# Patient Record
Sex: MALE | Race: WHITE | NOT HISPANIC OR LATINO | Employment: OTHER | URBAN - METROPOLITAN AREA
[De-identification: names, ages, dates, MRNs, and addresses within clinical notes are randomized per-mention and may not be internally consistent; named-entity substitution may affect disease eponyms.]

---

## 2017-02-21 ENCOUNTER — COMMUNICATION - HEALTHEAST (OUTPATIENT)
Dept: FAMILY MEDICINE | Facility: CLINIC | Age: 62
End: 2017-02-21

## 2017-02-21 DIAGNOSIS — F32.9 MAJOR DEPRESSIVE DISORDER, SINGLE EPISODE, UNSPECIFIED: ICD-10-CM

## 2017-02-23 ENCOUNTER — COMMUNICATION - HEALTHEAST (OUTPATIENT)
Dept: FAMILY MEDICINE | Facility: CLINIC | Age: 62
End: 2017-02-23

## 2017-02-23 DIAGNOSIS — I10 ESSENTIAL HYPERTENSION, BENIGN: ICD-10-CM

## 2017-03-06 ENCOUNTER — OFFICE VISIT - HEALTHEAST (OUTPATIENT)
Dept: FAMILY MEDICINE | Facility: CLINIC | Age: 62
End: 2017-03-06

## 2017-03-06 DIAGNOSIS — R10.9 ABDOMINAL PAIN: ICD-10-CM

## 2017-03-06 DIAGNOSIS — D12.6 BENIGN NEOPLASM OF COLON: ICD-10-CM

## 2017-03-06 DIAGNOSIS — H61.23 BILATERAL IMPACTED CERUMEN: ICD-10-CM

## 2017-03-06 DIAGNOSIS — Z00.00 ROUTINE GENERAL MEDICAL EXAMINATION AT A HEALTH CARE FACILITY: ICD-10-CM

## 2017-03-06 DIAGNOSIS — K29.70 GASTRITIS: ICD-10-CM

## 2017-03-06 DIAGNOSIS — H61.20 CERUMEN IMPACTION: ICD-10-CM

## 2017-03-06 DIAGNOSIS — Z72.0 TOBACCO USE: ICD-10-CM

## 2017-03-06 DIAGNOSIS — I25.10 CORONARY ATHEROSCLEROSIS: ICD-10-CM

## 2017-03-06 DIAGNOSIS — E78.5 HYPERLIPIDEMIA: ICD-10-CM

## 2017-03-06 LAB
CHOLEST SERPL-MCNC: 135 MG/DL
FASTING STATUS PATIENT QL REPORTED: YES
HDLC SERPL-MCNC: 50 MG/DL
LDLC SERPL CALC-MCNC: 73 MG/DL
PSA SERPL-MCNC: 1.4 NG/ML (ref 0–4.5)
TRIGL SERPL-MCNC: 61 MG/DL

## 2017-03-06 ASSESSMENT — MIFFLIN-ST. JEOR: SCORE: 1408.61

## 2017-04-26 ENCOUNTER — COMMUNICATION - HEALTHEAST (OUTPATIENT)
Dept: FAMILY MEDICINE | Facility: CLINIC | Age: 62
End: 2017-04-26

## 2017-04-26 DIAGNOSIS — E78.5 HYPERLIPIDEMIA: ICD-10-CM

## 2017-05-23 ENCOUNTER — COMMUNICATION - HEALTHEAST (OUTPATIENT)
Dept: FAMILY MEDICINE | Facility: CLINIC | Age: 62
End: 2017-05-23

## 2017-05-23 DIAGNOSIS — F32.9 MAJOR DEPRESSIVE DISORDER, SINGLE EPISODE, UNSPECIFIED: ICD-10-CM

## 2017-05-24 ENCOUNTER — COMMUNICATION - HEALTHEAST (OUTPATIENT)
Dept: FAMILY MEDICINE | Facility: CLINIC | Age: 62
End: 2017-05-24

## 2017-05-24 DIAGNOSIS — I10 ESSENTIAL HYPERTENSION, BENIGN: ICD-10-CM

## 2017-06-08 ENCOUNTER — RECORDS - HEALTHEAST (OUTPATIENT)
Dept: ADMINISTRATIVE | Facility: OTHER | Age: 62
End: 2017-06-08

## 2017-10-10 ENCOUNTER — COMMUNICATION - HEALTHEAST (OUTPATIENT)
Dept: FAMILY MEDICINE | Facility: CLINIC | Age: 62
End: 2017-10-10

## 2017-11-20 ENCOUNTER — OFFICE VISIT - HEALTHEAST (OUTPATIENT)
Dept: FAMILY MEDICINE | Facility: CLINIC | Age: 62
End: 2017-11-20

## 2017-11-20 DIAGNOSIS — E78.5 HYPERLIPIDEMIA: ICD-10-CM

## 2017-11-20 DIAGNOSIS — I10 ESSENTIAL HYPERTENSION, BENIGN: ICD-10-CM

## 2017-11-20 DIAGNOSIS — F32.9 MAJOR DEPRESSIVE DISORDER, SINGLE EPISODE: ICD-10-CM

## 2017-11-20 DIAGNOSIS — G89.29 CHRONIC SCAPULAR PAIN: ICD-10-CM

## 2017-11-20 DIAGNOSIS — M25.512 SHOULDER PAIN, LEFT: ICD-10-CM

## 2017-11-20 DIAGNOSIS — Z72.0 TOBACCO USE: ICD-10-CM

## 2017-11-20 DIAGNOSIS — M89.8X1 CHRONIC SCAPULAR PAIN: ICD-10-CM

## 2017-11-30 ENCOUNTER — RECORDS - HEALTHEAST (OUTPATIENT)
Dept: ADMINISTRATIVE | Facility: OTHER | Age: 62
End: 2017-11-30

## 2018-01-15 ENCOUNTER — COMMUNICATION - HEALTHEAST (OUTPATIENT)
Dept: FAMILY MEDICINE | Facility: CLINIC | Age: 63
End: 2018-01-15

## 2018-01-15 DIAGNOSIS — E78.5 HYPERLIPIDEMIA: ICD-10-CM

## 2018-02-06 ENCOUNTER — OFFICE VISIT - HEALTHEAST (OUTPATIENT)
Dept: FAMILY MEDICINE | Facility: CLINIC | Age: 63
End: 2018-02-06

## 2018-02-06 DIAGNOSIS — G56.02 CARPAL TUNNEL SYNDROME ON LEFT: ICD-10-CM

## 2018-02-06 DIAGNOSIS — G56.22 ULNAR NERVE COMPRESSION, LEFT: ICD-10-CM

## 2018-02-06 DIAGNOSIS — Z72.0 TOBACCO USE: ICD-10-CM

## 2018-02-06 DIAGNOSIS — I25.10 CORONARY ATHEROSCLEROSIS: ICD-10-CM

## 2018-02-06 DIAGNOSIS — I10 ESSENTIAL HYPERTENSION, BENIGN: ICD-10-CM

## 2018-02-06 DIAGNOSIS — Z01.818 PREOP EXAMINATION: ICD-10-CM

## 2018-02-06 LAB
ANION GAP SERPL CALCULATED.3IONS-SCNC: 9 MMOL/L (ref 5–18)
ATRIAL RATE - MUSE: 84 BPM
BUN SERPL-MCNC: 13 MG/DL (ref 8–22)
CALCIUM SERPL-MCNC: 8.9 MG/DL (ref 8.5–10.5)
CHLORIDE BLD-SCNC: 107 MMOL/L (ref 98–107)
CO2 SERPL-SCNC: 24 MMOL/L (ref 22–31)
CREAT SERPL-MCNC: 0.78 MG/DL (ref 0.7–1.3)
DIASTOLIC BLOOD PRESSURE - MUSE: NORMAL MMHG
ERYTHROCYTE [DISTWIDTH] IN BLOOD BY AUTOMATED COUNT: 10.8 % (ref 11–14.5)
GFR SERPL CREATININE-BSD FRML MDRD: >60 ML/MIN/1.73M2
GLUCOSE BLD-MCNC: 105 MG/DL (ref 70–125)
HCT VFR BLD AUTO: 47 % (ref 40–54)
HGB BLD-MCNC: 15.5 G/DL (ref 14–18)
INTERPRETATION ECG - MUSE: NORMAL
MCH RBC QN AUTO: 31.8 PG (ref 27–34)
MCHC RBC AUTO-ENTMCNC: 33 G/DL (ref 32–36)
MCV RBC AUTO: 97 FL (ref 80–100)
P AXIS - MUSE: 77 DEGREES
PLATELET # BLD AUTO: 162 THOU/UL (ref 140–440)
PMV BLD AUTO: 8.7 FL (ref 7–10)
POTASSIUM BLD-SCNC: 4.1 MMOL/L (ref 3.5–5)
PR INTERVAL - MUSE: 170 MS
QRS DURATION - MUSE: 88 MS
QT - MUSE: 366 MS
QTC - MUSE: 432 MS
R AXIS - MUSE: 79 DEGREES
RBC # BLD AUTO: 4.87 MILL/UL (ref 4.4–6.2)
SODIUM SERPL-SCNC: 140 MMOL/L (ref 136–145)
SYSTOLIC BLOOD PRESSURE - MUSE: NORMAL MMHG
T AXIS - MUSE: 72 DEGREES
VENTRICULAR RATE- MUSE: 84 BPM
WBC: 6.5 THOU/UL (ref 4–11)

## 2018-02-06 ASSESSMENT — MIFFLIN-ST. JEOR: SCORE: 1413.14

## 2018-02-10 ENCOUNTER — COMMUNICATION - HEALTHEAST (OUTPATIENT)
Dept: FAMILY MEDICINE | Facility: CLINIC | Age: 63
End: 2018-02-10

## 2018-02-10 DIAGNOSIS — I10 ESSENTIAL HYPERTENSION, BENIGN: ICD-10-CM

## 2018-04-02 ENCOUNTER — COMMUNICATION - HEALTHEAST (OUTPATIENT)
Dept: FAMILY MEDICINE | Facility: CLINIC | Age: 63
End: 2018-04-02

## 2018-05-01 ENCOUNTER — OFFICE VISIT - HEALTHEAST (OUTPATIENT)
Dept: FAMILY MEDICINE | Facility: CLINIC | Age: 63
End: 2018-05-01

## 2018-05-01 DIAGNOSIS — F32.9 MAJOR DEPRESSIVE DISORDER, SINGLE EPISODE: ICD-10-CM

## 2018-05-01 DIAGNOSIS — I10 ESSENTIAL HYPERTENSION, BENIGN: ICD-10-CM

## 2018-05-01 DIAGNOSIS — E78.5 HYPERLIPIDEMIA: ICD-10-CM

## 2018-05-01 LAB
ALT SERPL W P-5'-P-CCNC: 26 U/L (ref 0–45)
AST SERPL W P-5'-P-CCNC: 22 U/L (ref 0–40)
CHOLEST SERPL-MCNC: 144 MG/DL
FASTING STATUS PATIENT QL REPORTED: YES
HDLC SERPL-MCNC: 44 MG/DL
LDLC SERPL CALC-MCNC: 83 MG/DL
TRIGL SERPL-MCNC: 85 MG/DL

## 2018-05-01 ASSESSMENT — MIFFLIN-ST. JEOR: SCORE: 1464.17

## 2018-05-02 ENCOUNTER — COMMUNICATION - HEALTHEAST (OUTPATIENT)
Dept: FAMILY MEDICINE | Facility: CLINIC | Age: 63
End: 2018-05-02

## 2018-08-06 ENCOUNTER — COMMUNICATION - HEALTHEAST (OUTPATIENT)
Dept: FAMILY MEDICINE | Facility: CLINIC | Age: 63
End: 2018-08-06

## 2018-08-06 DIAGNOSIS — F32.9 MAJOR DEPRESSIVE DISORDER, SINGLE EPISODE: ICD-10-CM

## 2018-10-14 ENCOUNTER — COMMUNICATION - HEALTHEAST (OUTPATIENT)
Dept: FAMILY MEDICINE | Facility: CLINIC | Age: 63
End: 2018-10-14

## 2018-10-14 DIAGNOSIS — E78.5 HYPERLIPIDEMIA: ICD-10-CM

## 2019-01-23 ENCOUNTER — COMMUNICATION - HEALTHEAST (OUTPATIENT)
Dept: FAMILY MEDICINE | Facility: CLINIC | Age: 64
End: 2019-01-23

## 2019-01-23 DIAGNOSIS — I10 ESSENTIAL HYPERTENSION, BENIGN: ICD-10-CM

## 2019-04-11 ENCOUNTER — COMMUNICATION - HEALTHEAST (OUTPATIENT)
Dept: FAMILY MEDICINE | Facility: CLINIC | Age: 64
End: 2019-04-11

## 2019-04-11 DIAGNOSIS — E78.5 HYPERLIPIDEMIA: ICD-10-CM

## 2019-04-24 ENCOUNTER — COMMUNICATION - HEALTHEAST (OUTPATIENT)
Dept: FAMILY MEDICINE | Facility: CLINIC | Age: 64
End: 2019-04-24

## 2019-04-24 DIAGNOSIS — F32.9 MAJOR DEPRESSIVE DISORDER, SINGLE EPISODE: ICD-10-CM

## 2019-04-25 ENCOUNTER — COMMUNICATION - HEALTHEAST (OUTPATIENT)
Dept: FAMILY MEDICINE | Facility: CLINIC | Age: 64
End: 2019-04-25

## 2019-04-25 DIAGNOSIS — I10 ESSENTIAL HYPERTENSION, BENIGN: ICD-10-CM

## 2019-07-14 ENCOUNTER — COMMUNICATION - HEALTHEAST (OUTPATIENT)
Dept: FAMILY MEDICINE | Facility: CLINIC | Age: 64
End: 2019-07-14

## 2019-07-14 DIAGNOSIS — E78.5 HYPERLIPIDEMIA: ICD-10-CM

## 2019-07-26 ENCOUNTER — COMMUNICATION - HEALTHEAST (OUTPATIENT)
Dept: FAMILY MEDICINE | Facility: CLINIC | Age: 64
End: 2019-07-26

## 2019-07-26 DIAGNOSIS — I10 ESSENTIAL HYPERTENSION, BENIGN: ICD-10-CM

## 2019-10-31 ENCOUNTER — COMMUNICATION - HEALTHEAST (OUTPATIENT)
Dept: FAMILY MEDICINE | Facility: CLINIC | Age: 64
End: 2019-10-31

## 2019-10-31 DIAGNOSIS — I10 ESSENTIAL HYPERTENSION, BENIGN: ICD-10-CM

## 2019-12-01 ENCOUNTER — COMMUNICATION - HEALTHEAST (OUTPATIENT)
Dept: FAMILY MEDICINE | Facility: CLINIC | Age: 64
End: 2019-12-01

## 2019-12-01 DIAGNOSIS — I10 ESSENTIAL HYPERTENSION, BENIGN: ICD-10-CM

## 2019-12-18 ENCOUNTER — OFFICE VISIT - HEALTHEAST (OUTPATIENT)
Dept: FAMILY MEDICINE | Facility: CLINIC | Age: 64
End: 2019-12-18

## 2019-12-18 DIAGNOSIS — F32.9 MAJOR DEPRESSIVE DISORDER, SINGLE EPISODE: ICD-10-CM

## 2019-12-18 DIAGNOSIS — I25.10 ATHEROSCLEROSIS OF CORONARY ARTERY, ANGINA PRESENCE UNSPECIFIED, UNSPECIFIED VESSEL OR LESION TYPE, UNSPECIFIED WHETHER NATIVE OR TRANSPLANTED HEART: ICD-10-CM

## 2019-12-18 DIAGNOSIS — I10 ESSENTIAL HYPERTENSION, BENIGN: ICD-10-CM

## 2019-12-18 DIAGNOSIS — K57.30 DIVERTICULOSIS OF LARGE INTESTINE WITHOUT HEMORRHAGE: ICD-10-CM

## 2019-12-18 DIAGNOSIS — Z72.0 TOBACCO USE: ICD-10-CM

## 2019-12-18 DIAGNOSIS — D12.6 BENIGN NEOPLASM OF COLON, UNSPECIFIED PART OF COLON: ICD-10-CM

## 2019-12-18 DIAGNOSIS — R35.1 NOCTURIA: ICD-10-CM

## 2019-12-18 DIAGNOSIS — Z71.85 IMMUNIZATION COUNSELING: ICD-10-CM

## 2019-12-18 DIAGNOSIS — E78.5 HYPERLIPIDEMIA: ICD-10-CM

## 2019-12-18 LAB
ALBUMIN SERPL-MCNC: 4.1 G/DL (ref 3.5–5)
ALP SERPL-CCNC: 45 U/L (ref 45–120)
ALT SERPL W P-5'-P-CCNC: 19 U/L (ref 0–45)
ANION GAP SERPL CALCULATED.3IONS-SCNC: 14 MMOL/L (ref 5–18)
AST SERPL W P-5'-P-CCNC: 18 U/L (ref 0–40)
BILIRUB SERPL-MCNC: 0.3 MG/DL (ref 0–1)
BUN SERPL-MCNC: 7 MG/DL (ref 8–22)
CALCIUM SERPL-MCNC: 9.3 MG/DL (ref 8.5–10.5)
CHLORIDE BLD-SCNC: 106 MMOL/L (ref 98–107)
CHOLEST SERPL-MCNC: 134 MG/DL
CO2 SERPL-SCNC: 21 MMOL/L (ref 22–31)
CREAT SERPL-MCNC: 0.8 MG/DL (ref 0.7–1.3)
ERYTHROCYTE [DISTWIDTH] IN BLOOD BY AUTOMATED COUNT: 10.9 % (ref 11–14.5)
FASTING STATUS PATIENT QL REPORTED: NO
GFR SERPL CREATININE-BSD FRML MDRD: >60 ML/MIN/1.73M2
GLUCOSE BLD-MCNC: 81 MG/DL (ref 70–125)
HCT VFR BLD AUTO: 47.1 % (ref 40–54)
HDLC SERPL-MCNC: 50 MG/DL
HGB BLD-MCNC: 16 G/DL (ref 14–18)
LDLC SERPL CALC-MCNC: 65 MG/DL
MCH RBC QN AUTO: 32.7 PG (ref 27–34)
MCHC RBC AUTO-ENTMCNC: 34 G/DL (ref 32–36)
MCV RBC AUTO: 96 FL (ref 80–100)
PLATELET # BLD AUTO: 175 THOU/UL (ref 140–440)
PMV BLD AUTO: 9 FL (ref 7–10)
POTASSIUM BLD-SCNC: 4 MMOL/L (ref 3.5–5)
PROT SERPL-MCNC: 6.9 G/DL (ref 6–8)
PSA SERPL-MCNC: 1.8 NG/ML (ref 0–4.5)
RBC # BLD AUTO: 4.89 MILL/UL (ref 4.4–6.2)
SODIUM SERPL-SCNC: 141 MMOL/L (ref 136–145)
TRIGL SERPL-MCNC: 94 MG/DL
WBC: 6 THOU/UL (ref 4–11)

## 2019-12-18 ASSESSMENT — PATIENT HEALTH QUESTIONNAIRE - PHQ9: SUM OF ALL RESPONSES TO PHQ QUESTIONS 1-9: 2

## 2019-12-18 ASSESSMENT — MIFFLIN-ST. JEOR: SCORE: 1444.33

## 2020-01-29 ENCOUNTER — OFFICE VISIT - HEALTHEAST (OUTPATIENT)
Dept: FAMILY MEDICINE | Facility: CLINIC | Age: 65
End: 2020-01-29

## 2020-01-29 DIAGNOSIS — R10.32 LLQ ABDOMINAL PAIN: ICD-10-CM

## 2020-01-29 DIAGNOSIS — K57.30 DIVERTICULOSIS OF LARGE INTESTINE WITHOUT HEMORRHAGE: ICD-10-CM

## 2020-12-22 ENCOUNTER — COMMUNICATION - HEALTHEAST (OUTPATIENT)
Dept: FAMILY MEDICINE | Facility: CLINIC | Age: 65
End: 2020-12-22

## 2020-12-22 DIAGNOSIS — F32.9 MAJOR DEPRESSIVE DISORDER, SINGLE EPISODE: ICD-10-CM

## 2020-12-22 DIAGNOSIS — E78.5 HYPERLIPIDEMIA: ICD-10-CM

## 2020-12-22 RX ORDER — ATORVASTATIN CALCIUM 20 MG/1
TABLET, FILM COATED ORAL
Qty: 90 TABLET | Refills: 3 | Status: SHIPPED | OUTPATIENT
Start: 2020-12-22 | End: 2021-12-30

## 2020-12-22 RX ORDER — SERTRALINE HYDROCHLORIDE 100 MG/1
TABLET, FILM COATED ORAL
Qty: 90 TABLET | Refills: 3 | Status: SHIPPED | OUTPATIENT
Start: 2020-12-22 | End: 2021-12-30

## 2020-12-28 ENCOUNTER — COMMUNICATION - HEALTHEAST (OUTPATIENT)
Dept: FAMILY MEDICINE | Facility: CLINIC | Age: 65
End: 2020-12-28

## 2020-12-28 DIAGNOSIS — I10 ESSENTIAL HYPERTENSION, BENIGN: ICD-10-CM

## 2021-01-05 ENCOUNTER — COMMUNICATION - HEALTHEAST (OUTPATIENT)
Dept: FAMILY MEDICINE | Facility: CLINIC | Age: 66
End: 2021-01-05

## 2021-03-26 ENCOUNTER — COMMUNICATION - HEALTHEAST (OUTPATIENT)
Dept: FAMILY MEDICINE | Facility: CLINIC | Age: 66
End: 2021-03-26

## 2021-03-26 DIAGNOSIS — I10 ESSENTIAL HYPERTENSION, BENIGN: ICD-10-CM

## 2021-04-15 ENCOUNTER — COMMUNICATION - HEALTHEAST (OUTPATIENT)
Dept: FAMILY MEDICINE | Facility: CLINIC | Age: 66
End: 2021-04-15

## 2021-04-15 ENCOUNTER — OFFICE VISIT - HEALTHEAST (OUTPATIENT)
Dept: FAMILY MEDICINE | Facility: CLINIC | Age: 66
End: 2021-04-15

## 2021-04-15 DIAGNOSIS — Z00.00 HEALTH CARE MAINTENANCE: ICD-10-CM

## 2021-04-15 DIAGNOSIS — E55.9 VITAMIN D DEFICIENCY: ICD-10-CM

## 2021-04-15 DIAGNOSIS — R35.1 NOCTURIA: ICD-10-CM

## 2021-04-15 DIAGNOSIS — E78.5 HYPERLIPIDEMIA, UNSPECIFIED HYPERLIPIDEMIA TYPE: ICD-10-CM

## 2021-04-15 DIAGNOSIS — F32.5 MAJOR DEPRESSIVE DISORDER WITH SINGLE EPISODE, IN FULL REMISSION (H): ICD-10-CM

## 2021-04-15 DIAGNOSIS — D12.6 BENIGN NEOPLASM OF COLON, UNSPECIFIED PART OF COLON: ICD-10-CM

## 2021-04-15 DIAGNOSIS — I10 ESSENTIAL HYPERTENSION, BENIGN: ICD-10-CM

## 2021-04-15 DIAGNOSIS — I25.10 ATHEROSCLEROSIS OF CORONARY ARTERY, ANGINA PRESENCE UNSPECIFIED, UNSPECIFIED VESSEL OR LESION TYPE, UNSPECIFIED WHETHER NATIVE OR TRANSPLANTED HEART: ICD-10-CM

## 2021-04-15 RX ORDER — AMLODIPINE AND BENAZEPRIL HYDROCHLORIDE 10; 20 MG/1; MG/1
1 CAPSULE ORAL DAILY
Qty: 90 CAPSULE | Refills: 1 | Status: SHIPPED | OUTPATIENT
Start: 2021-04-15 | End: 2021-10-20

## 2021-04-15 ASSESSMENT — PATIENT HEALTH QUESTIONNAIRE - PHQ9: SUM OF ALL RESPONSES TO PHQ QUESTIONS 1-9: 0

## 2021-04-19 ENCOUNTER — AMBULATORY - HEALTHEAST (OUTPATIENT)
Dept: NURSING | Facility: CLINIC | Age: 66
End: 2021-04-19

## 2021-04-19 ENCOUNTER — AMBULATORY - HEALTHEAST (OUTPATIENT)
Dept: LAB | Facility: CLINIC | Age: 66
End: 2021-04-19

## 2021-04-19 DIAGNOSIS — D12.6 BENIGN NEOPLASM OF COLON, UNSPECIFIED PART OF COLON: ICD-10-CM

## 2021-04-19 DIAGNOSIS — Z01.30 BLOOD PRESSURE CHECK: ICD-10-CM

## 2021-04-19 DIAGNOSIS — I10 ESSENTIAL HYPERTENSION, BENIGN: ICD-10-CM

## 2021-04-19 DIAGNOSIS — E55.9 VITAMIN D DEFICIENCY: ICD-10-CM

## 2021-04-19 DIAGNOSIS — R35.1 NOCTURIA: ICD-10-CM

## 2021-04-19 DIAGNOSIS — E78.5 HYPERLIPIDEMIA, UNSPECIFIED HYPERLIPIDEMIA TYPE: ICD-10-CM

## 2021-04-19 LAB
ALBUMIN SERPL-MCNC: 4.1 G/DL (ref 3.5–5)
ALP SERPL-CCNC: 44 U/L (ref 45–120)
ALT SERPL W P-5'-P-CCNC: 23 U/L (ref 0–45)
ANION GAP SERPL CALCULATED.3IONS-SCNC: 14 MMOL/L (ref 5–18)
AST SERPL W P-5'-P-CCNC: 25 U/L (ref 0–40)
BILIRUB SERPL-MCNC: 0.5 MG/DL (ref 0–1)
BUN SERPL-MCNC: 11 MG/DL (ref 8–22)
CALCIUM SERPL-MCNC: 9.2 MG/DL (ref 8.5–10.5)
CHLORIDE BLD-SCNC: 105 MMOL/L (ref 98–107)
CHOLEST SERPL-MCNC: 157 MG/DL
CO2 SERPL-SCNC: 19 MMOL/L (ref 22–31)
CREAT SERPL-MCNC: 0.93 MG/DL (ref 0.7–1.3)
ERYTHROCYTE [DISTWIDTH] IN BLOOD BY AUTOMATED COUNT: 12.7 % (ref 11–14.5)
FASTING STATUS PATIENT QL REPORTED: NO
GFR SERPL CREATININE-BSD FRML MDRD: >60 ML/MIN/1.73M2
GLUCOSE BLD-MCNC: 108 MG/DL (ref 70–125)
HCT VFR BLD AUTO: 49.8 % (ref 40–54)
HDLC SERPL-MCNC: 51 MG/DL
HGB BLD-MCNC: 16.8 G/DL (ref 14–18)
LDLC SERPL CALC-MCNC: 74 MG/DL
MCH RBC QN AUTO: 32.2 PG (ref 27–34)
MCHC RBC AUTO-ENTMCNC: 33.7 G/DL (ref 32–36)
MCV RBC AUTO: 96 FL (ref 80–100)
PLATELET # BLD AUTO: 148 THOU/UL (ref 140–440)
PMV BLD AUTO: 11.6 FL (ref 7–10)
POTASSIUM BLD-SCNC: 3.9 MMOL/L (ref 3.5–5)
PROT SERPL-MCNC: 7.1 G/DL (ref 6–8)
PSA SERPL-MCNC: 3.3 NG/ML (ref 0–4.5)
RBC # BLD AUTO: 5.21 MILL/UL (ref 4.4–6.2)
SODIUM SERPL-SCNC: 138 MMOL/L (ref 136–145)
TRIGL SERPL-MCNC: 162 MG/DL
WBC: 5.5 THOU/UL (ref 4–11)

## 2021-04-20 ENCOUNTER — AMBULATORY - HEALTHEAST (OUTPATIENT)
Dept: FAMILY MEDICINE | Facility: CLINIC | Age: 66
End: 2021-04-20

## 2021-04-20 DIAGNOSIS — E55.9 VITAMIN D DEFICIENCY: ICD-10-CM

## 2021-04-20 LAB — 25(OH)D3 SERPL-MCNC: 19.8 NG/ML (ref 30–80)

## 2021-04-21 LAB — HCV AB SERPL QL IA: NEGATIVE

## 2021-04-22 ENCOUNTER — COMMUNICATION - HEALTHEAST (OUTPATIENT)
Dept: FAMILY MEDICINE | Facility: CLINIC | Age: 66
End: 2021-04-22

## 2021-04-23 ENCOUNTER — COMMUNICATION - HEALTHEAST (OUTPATIENT)
Dept: FAMILY MEDICINE | Facility: CLINIC | Age: 66
End: 2021-04-23

## 2021-04-24 ENCOUNTER — AMBULATORY - HEALTHEAST (OUTPATIENT)
Dept: NURSING | Facility: CLINIC | Age: 66
End: 2021-04-24

## 2021-05-15 ENCOUNTER — AMBULATORY - HEALTHEAST (OUTPATIENT)
Dept: NURSING | Facility: CLINIC | Age: 66
End: 2021-05-15

## 2021-05-26 ASSESSMENT — PATIENT HEALTH QUESTIONNAIRE - PHQ9: SUM OF ALL RESPONSES TO PHQ QUESTIONS 1-9: 2

## 2021-05-27 VITALS — DIASTOLIC BLOOD PRESSURE: 84 MMHG | HEART RATE: 84 BPM | SYSTOLIC BLOOD PRESSURE: 130 MMHG

## 2021-05-27 ASSESSMENT — PATIENT HEALTH QUESTIONNAIRE - PHQ9: SUM OF ALL RESPONSES TO PHQ QUESTIONS 1-9: 0

## 2021-05-27 NOTE — TELEPHONE ENCOUNTER
Due to be seen    Rx renewed per Medication Renewal Policy. Medication was last renewed on 10/15/18.    Mamta Lai, [facility] Triage/Med Refill 4/12/2019     Requested Prescriptions   Pending Prescriptions Disp Refills     atorvastatin (LIPITOR) 20 MG tablet [Pharmacy Med Name: ATORVASTATIN 20MG TABLETS] 90 tablet 0     Sig: TAKE 1 TABLET BY MOUTH EVERY DAY       Statins Refill Protocol (Hmg CoA Reductase Inhibitors) Passed - 4/11/2019  1:06 PM        Passed - PCP or prescribing provider visit in past 12 months      Last office visit with prescriber/PCP: 5/1/2018 Almas Mathews MD OR same dept: 5/1/2018 Almas Mathews MD OR same specialty: 5/1/2018 Almas Mathews MD  Last physical: 2/6/2018 Last MTM visit: Visit date not found   Next visit within 3 mo: Visit date not found  Next physical within 3 mo: Visit date not found  Prescriber OR PCP: Almas Mathews MD  Last diagnosis associated with med order: 1. Hyperlipidemia  - atorvastatin (LIPITOR) 20 MG tablet [Pharmacy Med Name: ATORVASTATIN 20MG TABLETS]; TAKE 1 TABLET BY MOUTH EVERY DAY  Dispense: 90 tablet; Refill: 0    If protocol passes may refill for 12 months if within 3 months of last provider visit (or a total of 15 months).

## 2021-05-28 NOTE — TELEPHONE ENCOUNTER
RN cannot approve Refill Request    RN can NOT refill this medication PCP messaged that patient is overdue for Labs. Last office visit: 5/1/2018 Almas Mathews MD Last Physical: 2/6/2018 Last MTM visit: Visit date not found Last visit same specialty: 5/1/2018 Almas Mathews MD.  Next visit within 3 mo: Visit date not found  Next physical within 3 mo: Visit date not found      Corin Ribera, [facility] Triage/Med Refill 4/27/2019    Requested Prescriptions   Pending Prescriptions Disp Refills     amLODIPine-benazepril (LOTREL) 10-20 mg per capsule [Pharmacy Med Name: AMLODIPINE-BENAZ 10/20MG CAPSULES] 90 capsule 0     Sig: TAKE 1 CAPSULE BY MOUTH DAILY       Ace Inhibitors Refill Protocol Failed - 4/25/2019  8:36 AM        Failed - Serum Potassium in past 12 months     No results found for: LN-POTASSIUM          Failed - Serum Creatinine in past 12 months     Creatinine   Date Value Ref Range Status   02/06/2018 0.78 0.70 - 1.30 mg/dL Final             Passed - PCP or prescribing provider visit in past 12 months       Last office visit with prescriber/PCP: 5/1/2018 Almas Mathews MD OR same dept: 5/1/2018 Almas Mathews MD OR same specialty: 5/1/2018 Almas Mathews MD  Last physical: 2/6/2018 Last MTM visit: Visit date not found   Next visit within 3 mo: Visit date not found  Next physical within 3 mo: Visit date not found  Prescriber OR PCP: Almas Mathews MD  Last diagnosis associated with med order: 1. Benign Essential Hypertension  - amLODIPine-benazepril (LOTREL) 10-20 mg per capsule [Pharmacy Med Name: AMLODIPINE-BENAZ 10/20MG CAPSULES]; TAKE 1 CAPSULE BY MOUTH DAILY  Dispense: 90 capsule; Refill: 0    If protocol passes may refill for 12 months if within 3 months of last provider visit (or a total of 15 months).             Passed - Blood pressure filed in past 12 months     BP Readings from Last 1 Encounters:   05/01/18 118/70

## 2021-05-28 NOTE — TELEPHONE ENCOUNTER
Due to be seen    Rx renewed per Medication Renewal Policy. Medication was last renewed on 8/6/18.    Mamta Lai, [facility] Triage/Med Refill 4/26/2019     Requested Prescriptions   Pending Prescriptions Disp Refills     sertraline (ZOLOFT) 100 MG tablet [Pharmacy Med Name: SERTRALINE 100MG TABLETS] 30 tablet 0     Sig: TAKE 1 TABLET(100 MG) BY MOUTH DAILY       [facility] Refill Protocol  Passed - 4/24/2019 10:19 PM        Passed - PCP or prescribing provider visit in last year     Last office visit with prescriber/PCP: 5/1/2018 Almas Mathews MD OR same dept: 5/1/2018 Almas Mathews MD OR same specialty: 5/1/2018 Almas Mathews MD  Last physical: 2/6/2018 Last MTM visit: Visit date not found   Next visit within 3 mo: Visit date not found  Next physical within 3 mo: Visit date not found  Prescriber OR PCP: Almas Mathews MD  Last diagnosis associated with med order: 1. Major depressive disorder, single episode  - sertraline (ZOLOFT) 100 MG tablet [Pharmacy Med Name: SERTRALINE 100MG TABLETS]; TAKE 1 TABLET(100 MG) BY MOUTH DAILY  Dispense: 30 tablet; Refill: 0    If protocol passes may refill for 12 months if within 3 months of last provider visit (or a total of 15 months).

## 2021-05-30 VITALS — WEIGHT: 142 LBS | HEIGHT: 68 IN | BODY MASS INDEX: 21.52 KG/M2

## 2021-05-30 NOTE — TELEPHONE ENCOUNTER
RN cannot approve Refill Request    RN can NOT refill this medication PCP messaged that patient is overdue for Office Visit and Protocol failed and NO refill given.   Nery Gonzalez, [facility] Triage/Med Refill 7/26/2019    Requested Prescriptions   Pending Prescriptions Disp Refills     amLODIPine-benazepril (LOTREL) 10-20 mg per capsule [Pharmacy Med Name: AMLODIPINE-BENAZ 10/20MG CAPSULES] 90 capsule 0     Sig: TAKE 1 CAPSULE BY MOUTH DAILY       Ace Inhibitors Refill Protocol Failed - 7/26/2019  1:33 PM        Failed - PCP or prescribing provider visit in past 12 months       Last office visit with prescriber/PCP: 5/1/2018 Almas Mathews MD OR same dept: Visit date not found OR same specialty: 5/1/2018 Almas Mathews MD  Last physical: 2/6/2018 Last MTM visit: Visit date not found   Next visit within 3 mo: Visit date not found  Next physical within 3 mo: Visit date not found  Prescriber OR PCP: Almas Mathews MD  Last diagnosis associated with med order: 1. Benign Essential Hypertension  - amLODIPine-benazepril (LOTREL) 10-20 mg per capsule [Pharmacy Med Name: AMLODIPINE-BENAZ 10/20MG CAPSULES]; TAKE 1 CAPSULE BY MOUTH DAILY  Dispense: 90 capsule; Refill: 0    If protocol passes may refill for 12 months if within 3 months of last provider visit (or a total of 15 months).             Failed - Serum Potassium in past 12 months     No results found for: LN-POTASSIUM          Failed - Blood pressure filed in past 12 months     BP Readings from Last 1 Encounters:   05/01/18 118/70             Failed - Serum Creatinine in past 12 months     Creatinine   Date Value Ref Range Status   02/06/2018 0.78 0.70 - 1.30 mg/dL Final

## 2021-05-30 NOTE — TELEPHONE ENCOUNTER
Refill Given    Refill given per Policy, patient informed they are overdue for Office Visit   OV 5/1/18    Val Anderson, [facility] Triage/Med Refill 7/14/2019    Requested Prescriptions   Pending Prescriptions Disp Refills     atorvastatin (LIPITOR) 20 MG tablet [Pharmacy Med Name: ATORVASTATIN 20MG TABLETS] 90 tablet 0     Sig: TAKE 1 TABLET BY MOUTH EVERY DAY       Statins Refill Protocol (Hmg CoA Reductase Inhibitors) Failed - 7/14/2019  2:18 PM        Failed - PCP or prescribing provider visit in past 12 months      Last office visit with prescriber/PCP: 5/1/2018 Almas Mathews MD OR same dept: Visit date not found OR same specialty: 5/1/2018 Almas Mathews MD  Last physical: 2/6/2018 Last MTM visit: Visit date not found   Next visit within 3 mo: Visit date not found  Next physical within 3 mo: Visit date not found  Prescriber OR PCP: Almas Mathews MD  Last diagnosis associated with med order: 1. Hyperlipidemia  - atorvastatin (LIPITOR) 20 MG tablet [Pharmacy Med Name: ATORVASTATIN 20MG TABLETS]; TAKE 1 TABLET BY MOUTH EVERY DAY  Dispense: 90 tablet; Refill: 0    If protocol passes may refill for 12 months if within 3 months of last provider visit (or a total of 15 months).

## 2021-05-30 NOTE — TELEPHONE ENCOUNTER
Left message #1 at [phone] to call clinic to schedule appt with PCP for lab follow up. Postponing task out to a week and will try again.

## 2021-05-31 ENCOUNTER — RECORDS - HEALTHEAST (OUTPATIENT)
Dept: ADMINISTRATIVE | Facility: CLINIC | Age: 66
End: 2021-05-31

## 2021-05-31 VITALS — WEIGHT: 143 LBS | BODY MASS INDEX: 21.67 KG/M2 | HEIGHT: 68 IN

## 2021-05-31 VITALS — WEIGHT: 145 LBS | BODY MASS INDEX: 22.05 KG/M2

## 2021-06-01 VITALS — WEIGHT: 149 LBS | HEIGHT: 70 IN | BODY MASS INDEX: 21.33 KG/M2

## 2021-06-02 NOTE — TELEPHONE ENCOUNTER
RN cannot approve Refill Request    RN can NOT refill this medication Protocol failed and NO refill given.        Mamta Lai, [facility] Triage/Med Refill 10/31/2019    Requested Prescriptions   Pending Prescriptions Disp Refills     amLODIPine-benazepril (LOTREL) 10-20 mg per capsule [Pharmacy Med Name: AMLODIPINE-BENAZ 10/20MG CAPSULES] 90 capsule 3     Sig: TAKE 1 CAPSULE BY MOUTH EVERY DAY       Ace Inhibitors Refill Protocol Failed - 10/31/2019  3:53 AM        Failed - PCP or prescribing provider visit in past 12 months       Last office visit with prescriber/PCP: 5/1/2018 Almas Mathews MD OR same dept: Visit date not found OR same specialty: 5/1/2018 Almas Mathews MD  Last physical: 2/6/2018 Last MTM visit: Visit date not found   Next visit within 3 mo: Visit date not found  Next physical within 3 mo: Visit date not found  Prescriber OR PCP: Almas Mathews MD  Last diagnosis associated with med order: 1. Benign Essential Hypertension  - amLODIPine-benazepril (LOTREL) 10-20 mg per capsule [Pharmacy Med Name: AMLODIPINE-BENAZ 10/20MG CAPSULES]; TAKE 1 CAPSULE BY MOUTH EVERY DAY  Dispense: 90 capsule; Refill: 0    If protocol passes may refill for 12 months if within 3 months of last provider visit (or a total of 15 months).             Failed - Serum Potassium in past 12 months     No results found for: LN-POTASSIUM          Failed - Blood pressure filed in past 12 months     BP Readings from Last 1 Encounters:   05/01/18 118/70             Failed - Serum Creatinine in past 12 months     Creatinine   Date Value Ref Range Status   02/06/2018 0.78 0.70 - 1.30 mg/dL Final

## 2021-06-03 VITALS
DIASTOLIC BLOOD PRESSURE: 70 MMHG | HEART RATE: 101 BPM | BODY MASS INDEX: 22.58 KG/M2 | OXYGEN SATURATION: 97 % | HEIGHT: 68 IN | WEIGHT: 149 LBS | SYSTOLIC BLOOD PRESSURE: 124 MMHG

## 2021-06-03 NOTE — TELEPHONE ENCOUNTER
RN cannot approve Refill Request    RN can NOT refill this medication Protocol failed and NO refill given.      Mamta Lai, [facility] Triage/Med Refill 12/1/2019    Requested Prescriptions   Pending Prescriptions Disp Refills     amLODIPine-benazepril (LOTREL) 10-20 mg per capsule [Pharmacy Med Name: AMLODIPINE-BENAZ 10/20MG CAPSULES] 90 capsule 3     Sig: TAKE 1 CAPSULE BY MOUTH EVERY DAY       Ace Inhibitors Refill Protocol Failed - 12/1/2019  3:54 AM        Failed - PCP or prescribing provider visit in past 12 months       Last office visit with prescriber/PCP: 5/1/2018 Almas Mathews MD OR same dept: Visit date not found OR same specialty: 5/1/2018 Almas Mathews MD  Last physical: 2/6/2018 Last MTM visit: Visit date not found   Next visit within 3 mo: Visit date not found  Next physical within 3 mo: Visit date not found  Prescriber OR PCP: Almas Mathews MD  Last diagnosis associated with med order: 1. Benign Essential Hypertension  - amLODIPine-benazepril (LOTREL) 10-20 mg per capsule [Pharmacy Med Name: AMLODIPINE-BENAZ 10/20MG CAPSULES]; TAKE 1 CAPSULE BY MOUTH EVERY DAY  Dispense: 30 capsule; Refill: 0    If protocol passes may refill for 12 months if within 3 months of last provider visit (or a total of 15 months).             Failed - Serum Potassium in past 12 months     No results found for: LN-POTASSIUM          Failed - Blood pressure filed in past 12 months     BP Readings from Last 1 Encounters:   05/01/18 118/70             Failed - Serum Creatinine in past 12 months     Creatinine   Date Value Ref Range Status   02/06/2018 0.78 0.70 - 1.30 mg/dL Final

## 2021-06-04 VITALS
HEART RATE: 78 BPM | RESPIRATION RATE: 16 BRPM | BODY MASS INDEX: 22.34 KG/M2 | SYSTOLIC BLOOD PRESSURE: 126 MMHG | WEIGHT: 148 LBS | DIASTOLIC BLOOD PRESSURE: 74 MMHG

## 2021-06-04 NOTE — PROGRESS NOTES
Subjective: This patient's not been in since May 2018 he has hypertension hyperlipidemia he smokes he has a history of major depression.  He continues on sertraline PHQ 9 was checked today came back at 2.    He had a colonoscopy 2017 due again 2022 he has a history of adenomatous colon polyps.    4 of his siblings  in the last 2-1/2 years he has 16 siblings he is #10.  Cause of death was COPD and MI's.    He retired in the last year as well.    Patient's had some intermittent pain in through the left lower quadrant on and off.  Does improve with bowel movement.    Exam today was unremarkable he will monitor that consider CT scan if not improved or recurs.    Patient had some calcification of his coronary arteries in the past continues on Lipitor 20 mg a day takes an aspirin takes amlodipine for blood pressure.    Denies any shortness of breath or chest pain.    Talked to him again regarding some low-dose CT scans of the lungs to monitor his smoking use, lung cancer risk.  He did not want to do that but did agree to a chest x-ray.  He continues to smoke    Tobacco status: He  reports that he has been smoking cigarettes. He has been smoking about 1.50 packs per day. He has never used smokeless tobacco.    Patient Active Problem List    Diagnosis Date Noted     Benign Adenomatous Polyp Of The Large Intestine      Coronary Arteriosclerosis      Major depressive disorder, single episode      Abdominal Pain      Hyperlipidemia      Benign Essential Hypertension      Hemorrhoids      Diverticulosis        Current Outpatient Medications   Medication Sig Dispense Refill     amLODIPine-benazepril (LOTREL) 10-20 mg per capsule Take 1 capsule by mouth daily. 90 capsule 3     aspirin 81 mg chewable tablet Chew 81 mg daily.       atorvastatin (LIPITOR) 20 MG tablet Take 1 tablet (20 mg total) by mouth daily. 90 tablet 3     sertraline (ZOLOFT) 100 MG tablet Take 1 tablet (100 mg total) by mouth daily. 90 tablet 3  "    No current facility-administered medications for this visit.        ROS:   10 point review of systems positive as outlined above otherwise negative.  Patient does smoke not interested in quitting.  Patient does have some nocturia x1 they did check PSA and did a rectal exam which was normal.        Objective:    /70 (Patient Site: Right Arm, Patient Position: Sitting, Cuff Size: Adult Regular)   Pulse (!) 101   Ht 5' 8.25\" (1.734 m)   Wt 149 lb (67.6 kg)   SpO2 97%   BMI 22.49 kg/m    Body mass index is 22.49 kg/m .      General appearance no acute distress    HEENT neck supple oropharynx clear, canals and TMs normal pupils react normally    Neck without adenopathy    Lungs are clear slightly diminished O2 sat 97%    Heart was regular S1-S2 rate at .    Abdomen soft bowel sounds normal no guarding or rebound    Lower extremities without edema skin was normal.    Rectal exam was normal no masses prostate without significant enlargement.    Testes descended normally no evidence of hernia    Labs PSA normal CBC normal    Lipids look good with LDL at 65.    CMP was also normal    Results for orders placed or performed in visit on 12/18/19   PSA, Annual Screen (Prostatic-Specific Antigen)   Result Value Ref Range    PSA 1.8 0.0 - 4.5 ng/mL   HM2(CBC w/o Differential)   Result Value Ref Range    WBC 6.0 4.0 - 11.0 thou/uL    RBC 4.89 4.40 - 6.20 mill/uL    Hemoglobin 16.0 14.0 - 18.0 g/dL    Hematocrit 47.1 40.0 - 54.0 %    MCV 96 80 - 100 fL    MCH 32.7 27.0 - 34.0 pg    MCHC 34.0 32.0 - 36.0 g/dL    RDW 10.9 (L) 11.0 - 14.5 %    Platelets 175 140 - 440 thou/uL    MPV 9.0 7.0 - 10.0 fL   Lipid Cascade RANDOM   Result Value Ref Range    Cholesterol 134 <=199 mg/dL    Triglycerides 94 <=149 mg/dL    HDL Cholesterol 50 >=40 mg/dL    LDL Calculated 65 <=129 mg/dL    Patient Fasting > 8hrs? No    Comprehensive Metabolic Panel   Result Value Ref Range    Sodium 141 136 - 145 mmol/L    Potassium 4.0 3.5 - " 5.0 mmol/L    Chloride 106 98 - 107 mmol/L    CO2 21 (L) 22 - 31 mmol/L    Anion Gap, Calculation 14 5 - 18 mmol/L    Glucose 81 70 - 125 mg/dL    BUN 7 (L) 8 - 22 mg/dL    Creatinine 0.80 0.70 - 1.30 mg/dL    GFR MDRD Af Amer >60 >60 mL/min/1.73m2    GFR MDRD Non Af Amer >60 >60 mL/min/1.73m2    Bilirubin, Total 0.3 0.0 - 1.0 mg/dL    Calcium 9.3 8.5 - 10.5 mg/dL    Protein, Total 6.9 6.0 - 8.0 g/dL    Albumin 4.1 3.5 - 5.0 g/dL    Alkaline Phosphatase 45 45 - 120 U/L    AST 18 0 - 40 U/L    ALT 19 0 - 45 U/L       Assessment:  1. Benign Essential Hypertension  amLODIPine-benazepril (LOTREL) 10-20 mg per capsule    Comprehensive Metabolic Panel   2. Major depressive disorder, single episode  sertraline (ZOLOFT) 100 MG tablet   3. Hyperlipidemia  atorvastatin (LIPITOR) 20 MG tablet    Lipid Murray RANDOM    Comprehensive Metabolic Panel   4. Atherosclerosis of coronary artery, angina presence unspecified, unspecified vessel or lesion type, unspecified whether native or transplanted heart     5. Benign neoplasm of colon, unspecified part of colon  HM2(CBC w/o Differential)   6. Tobacco use  XR Chest 2 Views   7. Nocturia  PSA, Annual Screen (Prostatic-Specific Antigen)   8. Immunization counseling  Pneumococcal polysaccharide vaccine 23-valent 1 yo or older, subq/IM   9. Diverticulosis of large intestine without hemorrhage       Hypertension controlled continue amlodipine benazepril.    Major depression stable PHQ 9 was 2 continue sertraline    Hyperlipidemia controlled on atorvastatin    History of coronary artery disease please see above no active symptoms    History of adenomatous colon polyp recheck in June 2022 with next colonoscopy    Encouraged to quit smoking.  Chest x-ray shows some hyperinflation of the lungs but no new infiltrate or effusion.    Immunization update with PPV 23    Plan: As outlined above, history of some left lower quadrant pain exam was normal now monitor symptoms consider CT scan he will  let me know, discussed possibly some diverticulitis that might flareup..  I did review his colonoscopy and does have significant diverticulosis noted on sigmoid colon.    Discussed appropriate diet with him regarding this as well    This transcription uses voice recognition software, which may contain typographical errors.

## 2021-06-05 NOTE — PROGRESS NOTES
Subjective:    Alonzo Renteria is a 64 y.o. male who presents for evaluation of possible diverticulitis.  He has past history of diverticulitis.  He thinks his last areas flareup, where he had to take antibiotics, was about 6 years ago.  He feels like he has had off-and-on flareups over the past several months, but they have been manageable.  Now, for the past week he has been having significant left lower quadrant pain.  Pain can radiate to his back or his groin and feel achy.  Seems to be worst first thing in the morning.  He has been using ibuprofen and that helps somewhat.  No fevers.  No vomiting.  Occasional nausea.  Normal appetite.  No constipation.  Symptoms are not improving.  He says the symptoms are consistent with when he has had more significant diverticulitis attacks in the past.    Patient Active Problem List   Diagnosis     Benign Adenomatous Polyp Of The Large Intestine     Coronary Arteriosclerosis     Major depressive disorder, single episode     Abdominal Pain     Hyperlipidemia     Benign Essential Hypertension     Hemorrhoids     Diverticulosis       Current Outpatient Medications:      amLODIPine-benazepril (LOTREL) 10-20 mg per capsule, Take 1 capsule by mouth daily., Disp: 90 capsule, Rfl: 3     aspirin 81 mg chewable tablet, Chew 81 mg daily., Disp: , Rfl:      atorvastatin (LIPITOR) 20 MG tablet, Take 1 tablet (20 mg total) by mouth daily., Disp: 90 tablet, Rfl: 3     sertraline (ZOLOFT) 100 MG tablet, Take 1 tablet (100 mg total) by mouth daily., Disp: 90 tablet, Rfl: 3     ciprofloxacin HCl (CIPRO) 500 MG tablet, Take 1 tablet (500 mg total) by mouth 2 (two) times a day for 10 days., Disp: 20 tablet, Rfl: 0     metroNIDAZOLE (FLAGYL) 500 MG tablet, Take 1 tablet (500 mg total) by mouth 3 (three) times a day for 10 days., Disp: 30 tablet, Rfl: 0     Objective:   Allergies:  Clindamycin    Vitals:  Vitals:    01/29/20 1405   BP: 126/74   Patient Site: Left Arm   Patient Position: Sitting    Cuff Size: Adult Regular   Pulse: 78   Resp: 16   Weight: 148 lb (67.1 kg)     Body mass index is 22.34 kg/m .    Vital signs reviewed.  General: Patient is alert and oriented x 3, in no apparent distress  Cardiac: regular rate and rhythm, no murmurs  Pulmonary: lungs clear to auscultation bilaterally, no crackles, rales, rhonchi, or wheezing noted  Abdomen: Mild pain with palpation of the left lower quadrant, no hepatosplenomegaly, negative Nagel's sign, no pain over McBurney's point, positive bowel sounds, no masses palpable      Assessment and Plan:   1.  Left lower quadrant pain with history of diverticulitis.  Probable diverticulitis episode.  Given his symptoms, history, and exam findings, I think treating him for diverticulitis is reasonable.  Appendicitis unlikely given presentation and exam findings.  Prescriptions sent today for Cipro and metronidazole for 10 days.  He has taken these before without problem.  He will continue to monitor his symptoms.  We reviewed specific reasons for him to notify us, or go in to the ER, such as fever, nausea and vomiting, or significantly worsening left lower quadrant pain.    This dictation uses voice recognition software, which may contain typographical errors.

## 2021-06-09 NOTE — PROGRESS NOTES
Subjective: This patient comes in for physical.  He was seen last year 2/23/16 please see that discussion.  He was having some abdominal symptoms at that time and had a mildly prominent pancreatic duct and a CT scan of the abdomen there was no mass no pancreatic mass    He went on for EGD as well as endoscopic ultrasound which was unremarkable.  There was some chronic reactive gastropathy changes.    He does smoke we discussed that that could be aggravating the stomach.  Discussed using omeprazole he didn't feel he had any symptoms so didn't want to do that.    He does have coronary artery calcifications he continues on Lipitor 20 mg a day also amlodipine for blood pressure he takes an aspirin a day.    Regarding his depression he stable on sertraline 100 mg 1 a day.    Patient has a history of colon polyps it's been 5 years he needs to get a colonoscopy he said he would schedule that I did put in a referral.    He continues to smoke he's had 40 pack years and does want to take Chantix I discussed pros cons side effects and elected to go ahead with that.  He'll contact us if he has any side effects at all, discussed increased risks for depression suicide and vivid dreams.    Also discussed getting a low dose screening CT scan for screening for lung cancer with the smoking he said he wanted to do that as well.    Tobacco status: He  reports that he has been smoking Cigarettes.  He has been smoking about 1.50 packs per day. He has never used smokeless tobacco.    Patient Active Problem List    Diagnosis Date Noted     Benign Adenomatous Polyp Of The Large Intestine      Coronary Arteriosclerosis      Major Depression, Single Episode      Abdominal Pain      Hyperlipidemia      Benign Essential Hypertension      Hemorrhoids      Diverticulosis        Current Outpatient Prescriptions   Medication Sig Dispense Refill     amLODIPine-benazepril (LOTREL) 10-20 mg per capsule TAKE ONE CAPSULE BY MOUTH DAILY 90 capsule 0      "aspirin 81 mg chewable tablet Chew 81 mg daily.       atorvastatin (LIPITOR) 20 MG tablet TAKE 1 TABLET (20 MG TOTAL) BY MOUTH DAILY. 90 tablet 3     sertraline (ZOLOFT) 100 MG tablet TAKE ONE TABLET BY MOUTH DAILY 30 tablet 2     varenicline (CHANTIX CONTINUING MONTH PAK) 1 mg tablet Take 1 tablet (1 mg total) by mouth 2 (two) times a day. Take with full glass of water. 60 tablet 1     varenicline (CHANTIX STARTING MONTH PAK) 0.5 mg (11)- 1 mg (42) tablet Give with meals and with a full glass of water. 53 tablet 0     No current facility-administered medications for this visit.        ROS:   10 point review of systems negative other than as outlined above, he does get some lower abdominal pain at times he does have a history of constipation, will see with colonoscopy shows    Objective:    Visit Vitals     /72 (Patient Site: Left Arm, Patient Position: Sitting, Cuff Size: Adult Large)     Pulse 80     Temp 98.4  F (36.9  C) (Oral)     Resp 20     Ht 5' 8\" (1.727 m)     Wt 142 lb (64.4 kg)     BMI 21.59 kg/m2     Body mass index is 21.59 kg/(m^2).      General appearance no acute distress.    HEENT neck without adenopathy oropharynx was clear no lesions teeth were normal, he does have pack wax in his ears these been bothering him for a while I tried to curette that out but was unable to he went on for irrigation of the ears    Pupils react normally extraocular movements are normal    Skin was normal in through the head and entire body.    Lungs had a few coarse breath sounds otherwise clear, he had a mild cough.    Heart was regular S1-S2 without murmur rate in the 80s    No axillary or inguinal adenopathy    No significant abdominal pain no masses    Extremities without edema pulses were somewhat diminished in the feet no atrophic changes.    No calf tenderness no claudication symptoms.    Genitalia normal descended testes no evidence of hernia    Rectal was done prostate nonenlarged no irregularities    Labs " CBC was normal as outlined in addition PSA lipid and CMP are pending.    Chest CT pending    Colonoscopy upcoming.    Results for orders placed or performed in visit on 03/06/17   2(CBC w/o Differential)   Result Value Ref Range    WBC 4.3 4.0 - 11.0 thou/uL    RBC 4.71 4.40 - 6.20 mill/uL    Hemoglobin 15.4 14.0 - 18.0 g/dL    Hematocrit 44.7 40.0 - 54.0 %    MCV 95 80 - 100 fL    MCH 32.7 27.0 - 34.0 pg    MCHC 34.4 32.0 - 36.0 g/dL    RDW 10.6 (L) 11.0 - 14.5 %    Platelets 145 140 - 440 thou/uL    MPV 8.8 7.0 - 10.0 fL       Assessment:  1. Routine general medical examination at a [facility] care facility  Comprehensive Metabolic Panel    2(CBC w/o Differential)    PSA (Prostatic-Specific Antigen), Annual Screen   2. Benign Adenomatous Polyp Of The Large Intestine  Ambulatory referral for Colonoscopy   3. Abdominal pain     4. Tobacco use  varenicline (CHANTIX CONTINUING MONTH PADMAJA) 1 mg tablet    varenicline (CHANTIX STARTING MONTH PADMAJA) 0.5 mg (11)- 1 mg (42) tablet    CT Low Dose Lung Screening Chest   5. Coronary Arteriosclerosis     6. Gastritis     7. Hyperlipidemia  Lipid [place] FASTING   8. Cerumen impaction     9. Bilateral impacted cerumen       Physical    Colonoscopy follow-up on colon polyps    Tobacco use Chantix    Depression stable on sertraline    Cerumen impaction bilaterally unable to curette, went on for ear irrigation bilaterally    Hyperlipidemia await results, patient's on atorvastatin 20 he does have coronary artery calcifications    40 year of smoking 1-1-1/2 packs a day, check CT scan regarding lungs    Plan:  As outlined above    This transcription uses voice recognition software, which may contain typographical errors.

## 2021-06-14 NOTE — TELEPHONE ENCOUNTER
Please contact this patient.  He needs a follow-up virtual visit with me please schedule in the next couple weeks.  Let him know I did refill his medicine

## 2021-06-14 NOTE — PROGRESS NOTES
Subjective: Patient comes in for evaluation.  He has had some ongoing problems with his left shoulder it hurts through the rhomboid area through the shoulder blade and in the shoulder itself.  He also describes some rib pain in the left upper side    He states that he will get an occasional intermittent numbness go down the arm also but no prolonged numbness no radicular pain per se.    He has been evaluated back in 2014 and had an x-ray which was unremarkable of the shoulder.    Patient has a lot of grinding in through the rhomboid and along the edge of the scapula.    No additional rash it is hurting him at work with lifting and pulling type of activities.    I also reviewed his smoking history I had suggested low-dose CT yearly screening.  He fits criteria.  I again discussed that with him and he was open to going ahead with low-dose CT scan for cancer screening in this patient whose smoked well over 30 pack years.  He is still smoking.  He is willing to go ahead with intervention surgically if needed.    Tobacco status: He  reports that he has been smoking Cigarettes.  He has been smoking about 1.50 packs per day. He has never used smokeless tobacco.    Patient Active Problem List    Diagnosis Date Noted     Benign Adenomatous Polyp Of The Large Intestine      Coronary Arteriosclerosis      Major Depression, Single Episode      Abdominal Pain      Hyperlipidemia      Benign Essential Hypertension      Hemorrhoids      Diverticulosis        Current Outpatient Prescriptions   Medication Sig Dispense Refill     amLODIPine-benazepril (LOTREL) 10-20 mg per capsule TAKE ONE CAPSULE BY MOUTH DAILY 90 capsule 2     aspirin 81 mg chewable tablet Chew 81 mg daily.       atorvastatin (LIPITOR) 20 MG tablet TAKE ONE TABLET BY MOUTH EVERY DAY 90 tablet 2     sertraline (ZOLOFT) 100 MG tablet Take 1 tablet (100 mg total) by mouth daily. 30 tablet 8     No current facility-administered medications for this visit.        ROS:  Review of systems negative other than as outlined above    Objective:    /68 (Patient Site: Left Arm, Patient Position: Sitting, Cuff Size: Adult Regular)  Pulse 80  Resp 16  Wt 145 lb (65.8 kg)  BMI 22.05 kg/m2  Body mass index is 22.05 kg/(m^2).      General appearance no acute distress.    Neck was supple no tenderness along cervical spine    He did have some left rhomboid pain and some grating on palpation with movement of the shoulder and scapula along the medial edge of the scapula.    There was minimal winging, but some asymmetry between left and right scapula.    Left shoulder with some tenderness through the glenohumeral joint he did have full range of motion but had discomfort with hyperabduction also discomfort with internal and external rotation.    Reflexes normal in upper extremity    No swelling    Lungs are clear.        Assessment:  1. Chronic scapular pain  Ambulatory referral to Orthopedic Surgery   2. Major depressive disorder, single episode  sertraline (ZOLOFT) 100 MG tablet   3. Hyperlipidemia  atorvastatin (LIPITOR) 20 MG tablet   4. Benign Essential Hypertension  amLODIPine-benazepril (LOTREL) 10-20 mg per capsule   5. Tobacco use  CT Low Dose Lung Screening Chest   6. Shoulder pain, left  Ambulatory referral to Orthopedic Surgery     Chronic scapular pain with some ongoing shoulder pain and grating through the rhomboid.  Will see orthopedist.    Smoking, low-dose CT lung screening chest ordered again    Refill meds for his depression, PHQ 9 score was 1    Hyperlipidemia continue Lipitor, hypertension continue on Lotrel    Plan: As above  This transcription uses voice recognition software, which may contain typographical errors.

## 2021-06-14 NOTE — TELEPHONE ENCOUNTER
Left message #3 at [phone]. We have made several attempts to contact patient by phone and letter to schedule an appointment. Unfortunately, our calls have not been returned and we were unable to schedule. At this time, we will no longer make an attempt to schedule this appointment. Completing task.

## 2021-06-14 NOTE — TELEPHONE ENCOUNTER
Left message #2 at [phone]. Sending letter out and postponing task out to 2 weeks and will try again if an appointment hasn't been made.

## 2021-06-15 NOTE — PROGRESS NOTES
Preoperative Exam    Scheduled Procedure: Left carpal tunnel and left ulnar surgery  Surgery Date:  2/19/2018  Surgery Location: [facility], fax [phone]    Surgeon:  Dr. Velasquez    Assessment/Plan:     1. Preop examination  HM2(CBC w/o Differential)   2. Carpal tunnel syndrome on left     3. Ulnar nerve compression, left     4. Benign Essential Hypertension  Basic Metabolic Panel   5. Tobacco use  XR Chest 2 Views   6. Coronary Arteriosclerosis  Electrocardiogram Perform - Clinic    XR Chest 2 Views       Patient had EMG positive findings for carpal tunnel syndrome and I believe ulnar nerve compression.  I do not have the results    Plan is for surgical release surgeries for this.    He has hypertension tobacco use and coronary arteriosclerosis.  He continues on his hypertensive and statin medications.  He continues to smoke.  Chest x-ray appeared clear of infiltrate    He is okay to proceed with the surgery    Surgical Procedure Risk: Low (reported cardiac risk generally < 1%)  Have you had prior anesthesia?: Yes  Have you or any family members had a previous anesthesia reaction:  No  Do you or any family members have a history of a clotting or bleeding disorder?: No  Cardiac Risk Assessment: no increased risk for major cardiac complications    Patient approved for surgery with general or local anesthesia.    Patient will hold the aspirin prior to surgery 1 week    Functional Status: Independent  Patient plans to recover at home alone.     Subjective:      Alonzo Renteria is a 62 y.o. male who presents for a preoperative consultation.  Patient had left arm numbness burning tingling symptoms.  Saw orthopedist got an EMG plan for surgery as outlined above.    Past surgical history for hernia repair he had no anesthesia or bleeding problems    He denies any cough congestion shortness of breath.    10 point review of systems reviewed and are negative, other than those listed  "in the HPI.    Pertinent History  Do you have difficulty breathing or chest pain after walking up a flight of stairs: Patient does have some dyspnea with activity but unchanged from previous  History of obstructive sleep apnea: No  Steroid use in the last 6 months: No  Frequent Aspirin/NSAID use: Yes: Aspirin   Prior Blood Transfusion: No  Prior Blood Transfusion Reaction: No  If for some reason prior to, during or after the procedure, if it is medically indicated, would you be willing to have a blood transfusion?:  There is no transfusion refusal.    Current Outpatient Prescriptions   Medication Sig Dispense Refill     amLODIPine-benazepril (LOTREL) 10-20 mg per capsule TAKE ONE CAPSULE BY MOUTH DAILY 90 capsule 2     aspirin 81 mg chewable tablet Chew 81 mg daily.       atorvastatin (LIPITOR) 20 MG tablet TAKE ONE TABLET BY MOUTH EVERY DAY 90 tablet 2     sertraline (ZOLOFT) 100 MG tablet Take 1 tablet (100 mg total) by mouth daily. 30 tablet 8     No current facility-administered medications for this visit.         Allergies   Allergen Reactions     Clindamycin Hives       Patient Active Problem List   Diagnosis     Benign Adenomatous Polyp Of The Large Intestine     Coronary Arteriosclerosis     Major Depression, Single Episode     Abdominal Pain     Hyperlipidemia     Benign Essential Hypertension     Hemorrhoids     Diverticulosis     Past surgical history: Hernia repair    No anesthesia or bleeding problems.  Past Medical History:   Diagnosis Date     COPD (chronic obstructive pulmonary disease)      Coronary artery disease     \"blockage\"     Diverticulitis      HTN (hypertension)        Past Surgical History:   Procedure Laterality Date     CARDIAC CATHETERIZATION N/A 2008     ESOPHAGOGASTRODUODENOSCOPY N/A 5/23/2016    Procedure: ESOPHAGOGASTRODUODENOSCOPY with biopsy;  Surgeon: aRy Thapa MD;  Location: [facility];  Service:      HERNIA REPAIR       NC EDG US EXAM SURGICAL ALTER STOM " "DUODENUM/JEJUNUM N/A 5/23/2016    Procedure: ENDOSCOPIC ULTRASOUND;  Surgeon: Ray Thapa MD;  Location: [facility];  Service: Gastroenterology       Social History     Social History     Marital status: Single     Spouse name: N/A     Number of children: N/A     Years of education: N/A     Occupational History     Not on file.     Social History Main Topics     Smoking status: Current Every Day Smoker     Packs/day: 1.50     Types: Cigarettes     Smokeless tobacco: Never Used     Alcohol use 4.8 oz/week     6 Cans of beer, 2 Shots of liquor per week      Comment: everyother weekend drinker     Drug use: Yes     Special: Marijuana     Sexual activity: Not on file     Other Topics Concern     Not on file     Social History Narrative       Family history negative for anesthesia and bleeding problems      Objective:     Vitals:    02/06/18 1009   BP: 136/84   Pulse: 90   Resp: 20   Temp: 97.2  F (36.2  C)   TempSrc: Oral   SpO2: 96%   Weight: 143 lb (64.9 kg)   Height: 5' 8\" (1.727 m)         Physical Exam:  General appearance no acute distress    HEENT neck is negative no adenopathy oropharynx is clear pupils react normally extraocular movements full    Canals and TMs normal    Lungs clear throughout no rales or rhonchi, heart regular S1-S2 rate in the 80-90 range    Abdomen soft nontender.    Extremities without edema skin was normal no rashes.    Left hand some diffuse numbness more palmar.    Equivocal Phalen test, negative Tinel.    EMG positive but I do not have a copy          EKG reviewed and agree with reading below.    BMP is pending    CBC is outlined below    Chest x-ray appeared clear of infiltrate slightly hyperexpanded lungs.  Radiologist reading pending    Labs:  Recent Results (from the past 24 hour(s))   HM2(CBC w/o Differential)    Collection Time: 02/06/18 10:28 AM   Result Value Ref Range    WBC 6.5 4.0 - 11.0 thou/uL    RBC 4.87 4.40 - 6.20 mill/uL    Hemoglobin 15.5 14.0 - 18.0 g/dL    " Hematocrit 47.0 40.0 - 54.0 %    MCV 97 80 - 100 fL    MCH 31.8 27.0 - 34.0 pg    MCHC 33.0 32.0 - 36.0 g/dL    RDW 10.8 (L) 11.0 - 14.5 %    Platelets 162 140 - 440 thou/uL    MPV 8.7 7.0 - 10.0 fL   Electrocardiogram Perform - Clinic    Collection Time: 02/06/18 10:42 AM   Result Value Ref Range    SYSTOLIC BLOOD PRESSURE  mmHg    DIASTOLIC BLOOD PRESSURE  mmHg    VENTRICULAR RATE 84 BPM    ATRIAL RATE 84 BPM    P-R INTERVAL 170 ms    QRS DURATION 88 ms    Q-T INTERVAL 366 ms    QTC CALCULATION (BEZET) 432 ms    P Axis 77 degrees    R AXIS 79 degrees    T AXIS 72 degrees    MUSE DIAGNOSIS       Normal sinus rhythm  Normal ECG  When compared with ECG of 19-NOV-2008 07:22,  No significant change was found         Immunization History   Administered Date(s) Administered     DT (pediatric) 04/27/2004     Influenza, inj, historic,unspecified 11/13/2017     Td,adult,historic,unspecified 04/27/2004     Tdap 02/23/2016           Electronically signed by Almas Mathews MD 02/06/18 10:10 AM

## 2021-06-16 NOTE — TELEPHONE ENCOUNTER
RN cannot approve Refill Request    RN can NOT refill this medication PCP messaged that patient is overdue for Office Visit. Last office visit: 12/18/2019 Almas Mathews MD Last Physical: 2/6/2018 Last MTM visit: Visit date not found Last visit same specialty: 1/29/2020 Estefanía Engle PA-C.  Next visit within 3 mo: Visit date not found  Next physical within 3 mo: Visit date not found      Corin Ribera, [facility] Triage/Med Refill 3/26/2021    Requested Prescriptions   Pending Prescriptions Disp Refills     amLODIPine-benazepril (LOTREL) 10-20 mg per capsule [Pharmacy Med Name: AMLODIPINE-BENAZ 10/20MG CAPSULES] 90 capsule 0     Sig: TAKE 1 CAPSULE BY MOUTH DAILY       Ace Inhibitors Refill Protocol Failed - 3/26/2021  3:55 AM        Failed - PCP or prescribing provider visit in past 12 months       Last office visit with prescriber/PCP: 12/18/2019 Almas Mathews MD OR same dept: Visit date not found OR same specialty: 1/29/2020 Estefanía Engle PA-C  Last physical: 2/6/2018 Last MTM visit: Visit date not found   Next visit within 3 mo: Visit date not found  Next physical within 3 mo: Visit date not found  Prescriber OR PCP: Almas Mathews MD  Last diagnosis associated with med order: 1. Benign Essential Hypertension  - amLODIPine-benazepril (LOTREL) 10-20 mg per capsule [Pharmacy Med Name: AMLODIPINE-BENAZ 10/20MG CAPSULES]; TAKE 1 CAPSULE BY MOUTH DAILY  Dispense: 90 capsule; Refill: 0    If protocol passes may refill for 12 months if within 3 months of last provider visit (or a total of 15 months).             Failed - Serum Potassium in past 12 months     No results found for: LN-POTASSIUM          Failed - Blood pressure filed in past 12 months     BP Readings from Last 1 Encounters:   01/29/20 126/74             Failed - Serum Creatinine in past 12 months     Creatinine   Date Value Ref Range Status   12/18/2019 0.80 0.70 - 1.30 mg/dL Final            Calcium-Channel Blockers  Protocol Failed - 3/26/2021  3:55 AM        Failed - PCP or prescribing provider visit in past 12 months or next 3 months     Last office visit with prescriber/PCP: 12/18/2019 Almas Mathews MD OR [facility] dept: Visit date not found OR same specialty: 1/29/2020 Estefanía Engle PA-C  Last physical: 2/6/2018 Last MTM visit: Visit date not found   Next visit within 3 mo: Visit date not found  Next physical within 3 mo: Visit date not found  Prescriber OR PCP: Almas Mathews MD  Last diagnosis associated with med order: 1. Benign Essential Hypertension  - amLODIPine-benazepril (LOTREL) 10-20 mg per capsule [Pharmacy Med Name: AMLODIPINE-BENAZ 10/20MG CAPSULES]; TAKE 1 CAPSULE BY MOUTH DAILY  Dispense: 90 capsule; Refill: 0    If protocol passes may refill for 12 months if within 3 months of last provider visit (or a total of 15 months).             Failed - Blood pressure filed in past 12 months     BP Readings from Last 1 Encounters:   01/29/20 126/74            Angiotensin Receptor Blocker Protocol Failed - 3/26/2021  3:55 AM        Failed - PCP or prescribing provider visit in past 12 months       Last office visit with prescriber/PCP: 12/18/2019 Almas Mathews MD OR [facility] dept: Visit date not found OR same specialty: 1/29/2020 Estefanía Engle PA-C  Last physical: 2/6/2018 Last MTM visit: Visit date not found   Next visit within 3 mo: Visit date not found  Next physical within 3 mo: Visit date not found  Prescriber OR PCP: Almas Mathews MD  Last diagnosis associated with med order: 1. Benign Essential Hypertension  - amLODIPine-benazepril (LOTREL) 10-20 mg per capsule [Pharmacy Med Name: AMLODIPINE-BENAZ 10/20MG CAPSULES]; TAKE 1 CAPSULE BY MOUTH DAILY  Dispense: 90 capsule; Refill: 0    If protocol passes may refill for 12 months if within 3 months of last provider visit (or a total of 15 months).             Failed - Serum potassium within the past 12 months     No results found for: LN-POTASSIUM           Failed - Blood pressure filed in past 12 months     BP Readings from Last 1 Encounters:   01/29/20 126/74             Failed - Serum creatinine within the past 12 months     Creatinine   Date Value Ref Range Status   12/18/2019 0.80 0.70 - 1.30 mg/dL Final

## 2021-06-16 NOTE — TELEPHONE ENCOUNTER
----- Message from Almas Mathews MD sent at 4/20/2021  1:49 PM CDT -----  Please contact this patient let him know the labs look good except the vitamin D was a little low at 19.8 it should be between 30 and 80.  I would like him to go on vitamin D 2000 units 1 a day I sent a prescription to his pharmacy    The PSA level from prostate was in the normal range at 3.3, the cholesterol levels look good total 157 bad cholesterol 74.    Liver kidney electrolytes and blood sugar were all normal.  Also hemoglobin and white count were normal    Other than the vitamin D no changes in medicines    Please schedule a face-to-face visit with me, for the end of the summer

## 2021-06-16 NOTE — TELEPHONE ENCOUNTER
----- Message from Almas Mathews MD sent at 4/21/2021 10:12 AM CDT -----  Please let patient know the hepatitis C level came back negative as well, good news

## 2021-06-16 NOTE — TELEPHONE ENCOUNTER
Patient has a future F2F appointment on 04/19/2021 with the lab AND NURSE VISIT. Completing task.    Patient has a future F2F appointment on 04/24/2021 FOR COVID VACCINES . Completing task.    Patient will call and schedule the [place] appt. Completing task

## 2021-06-16 NOTE — TELEPHONE ENCOUNTER
Please help schedule per request below.    CSS for bp check and lab visit now.    Schedule a face to face visit with Dr. Mathews in August.    Please add pt onto Covid schedule on 4/24.

## 2021-06-16 NOTE — PROGRESS NOTES
I met with Alonzo Renteria at the request of Almas Mathews MD   to recheck his blood pressure.  Blood pressure medications on the MAR were reviewed with patient.    Patient has taken all medications as per usual regimen: Yes  Patient reports tolerating them without any issues or concerns: Yes    Vitals:    04/19/21 1414   BP: 130/84   Patient Site: Left Arm   Patient Position: Sitting   Cuff Size: Adult Regular   Pulse: 84       Blood pressure was taken, previous encounter was reviewed, recorded blood pressure below 140/90.  Patient was discharged and the note will be sent to the provider for final review.

## 2021-06-16 NOTE — PROGRESS NOTES
Alonzo Renteria is a 65 y.o. male who is being evaluated via a billable telephone visit.      What phone number would you like to be contacted at? [phone]  How would you like to obtain your AVS? AVS Preference: Mail a copy.    Assessment & Plan     Alonzo was seen today for medication question or clarification.    Diagnoses and all orders for this visit:    Benign Essential Hypertension  -     amLODIPine-benazepril (LOTREL) 10-20 mg per capsule; Take 1 capsule by mouth daily. Needs visit prior to future refills.  -     Comprehensive Metabolic Panel; Future    Benign neoplasm of colon, unspecified part of colon  -     HM2(CBC w/o Differential); Future    Atherosclerosis of coronary artery, angina presence unspecified, unspecified vessel or lesion type, unspecified whether native or transplanted heart    Major depressive disorder with single episode, in full remission (H)    Vitamin D deficiency  -     Vitamin D, Total (25-Hydroxy); Future    Hyperlipidemia, unspecified hyperlipidemia type  -     Lipid [place] FASTING; Future  -     Comprehensive Metabolic Panel; Future    Nocturia  -     PSA (Prostatic-Specific Antigen), Diagnostic; Future    Health care maintenance  -     Hepatitis C Antibody (Anti-HCV)        Hypertension has been controlled continue amlodipine benazepril check CMP    Hyperlipidemia on atorvastatin 20 mg a day check lipid and liver test    Sertraline 100 mg a day for depression controlling things    Vitamin D deficiency we will check vitamin D    Nocturia check PSA    In addition we will check hepatitis C.    Coronary artery disease no active symptoms    History of adenomatous colon polyp, due for colonoscopy again June 2022          30 minutes spent on the date of the encounter doing chart review, history and exam, documentation and further activities per the note       Tobacco Cessation:   reports that he has been smoking cigarettes. He has been smoking about 1.50 packs per day. He has never  used smokeless tobacco.  I have counseled the patient for tobacco cessation and the follow up will occur  via phone.  Return in about 6 months (around 10/15/2021) for Annual physical.    Almas Mathews MD  [facility]    Subjective   Alonzo Renteria is 65 y.o. and presents today for the following health issues   HPI     This patient had a follow-up virtual visit, telephone, due to the coronavirus pandemic    He needs Covid immunizations we will have the nurse contact him regarding that    He will follow-up for a face-to-face physical in August    Labs today he will come in for CMP lipid PSA CBC and vitamin D    He has hypertension, history of adenomatous colon polyps    History of coronary artery disease, no symptoms    He is a smoker    Not ready to quit yet but feels he will be by the end of the year    He has hyperlipidemia.    Nocturia x1.    No additional concern or issue    I reviewed his meds of amlodipine benazepril 10/21 a day aspirin 1 a day atorvastatin 40 mg a day.    He takes occasional ibuprofen.    He does take sertraline for his depression has been controlling things he feels.        Review of Systems  10 point review of systems positive as outlined above otherwise negative      Objective       Vitals:  No vitals were obtained today due to virtual visit.    Physical Exam    General no acute distress  HEENT denies any congestion or sore throat    Lungs: Nonlabored breathing no wheezing by history    Heart: No palpitations rapid heart rate    Abdomen nontender, denies any bowel changes    Extremities without edema denies any skin changes.    He will come in for lab only for CMP lipid PSA CBC and vitamin D            Phone call duration: 22 minutes

## 2021-06-17 NOTE — TELEPHONE ENCOUNTER
Pt informed of message. He will  the Vitamin D from the pharmacy. He will call back to schedule the follow up visit.

## 2021-06-17 NOTE — PROGRESS NOTES
"Subjective: Patient comes in for follow-up he needs a recheck on his lipids in over a year, back in March 2017 LDL 73.  He continues on benazepril/amlodipine.  BMP was normal back in February he had labs prior to his surgery    He has left carpal tunnel syndrome and had surgery for that and left ulnar.  He still has some symptoms in the upper arm.  They talked about maybe monitoring and checking in through the neck but he is in hold off for now he does feel things have improved.    He has depression continues on medication PHQ 9 was 1 today.    He continues on sertraline 100 mg a day    Continues to smoke understands he should quit    Tobacco status: He  reports that he has been smoking Cigarettes.  He has been smoking about 1.50 packs per day. He has never used smokeless tobacco.    Patient Active Problem List    Diagnosis Date Noted     Benign Adenomatous Polyp Of The Large Intestine      Coronary Arteriosclerosis      Major depressive disorder, single episode      Abdominal Pain      Hyperlipidemia      Benign Essential Hypertension      Hemorrhoids      Diverticulosis        Current Outpatient Prescriptions   Medication Sig Dispense Refill     amLODIPine-benazepril (LOTREL) 10-20 mg per capsule TAKE 1 CAPSULE BY MOUTH DAILY 90 capsule 3     aspirin 81 mg chewable tablet Chew 81 mg daily.       atorvastatin (LIPITOR) 20 MG tablet TAKE ONE TABLET BY MOUTH EVERY DAY 90 tablet 2     sertraline (ZOLOFT) 100 MG tablet Take 1 tablet (100 mg total) by mouth daily. 30 tablet 8     No current facility-administered medications for this visit.        ROS:   Review of systems negative other than as outlined above    Objective:    /70 (Patient Site: Left Arm, Patient Position: Sitting, Cuff Size: Adult Regular)  Pulse 80  Temp 97.4  F (36.3  C) (Oral)   Resp 16  Ht 5' 9.5\" (1.765 m)  Wt 149 lb (67.6 kg)  SpO2 94% Comment: at rest with room air  BMI 21.69 kg/m2  Body mass index is 21.69 kg/(m^2).  The following " are part of a depression follow up plan for the patient:  under care of mental health team    General appearance no acute distress    Vital signs stable O2 sat looked good    Weight is stable    Lungs with some diminished breath sounds but clear heart was regular.    Incisions look clean regarding his left wrist and left ulnar area.    Extremities without edema    Lab work showed the LDL at 83 stable normal liver tests    Results for orders placed or performed in visit on 05/01/18   Lipid [place] FASTING   Result Value Ref Range    Cholesterol 144 <=199 mg/dL    Triglycerides 85 <=149 mg/dL    HDL Cholesterol 44 >=40 mg/dL    LDL Calculated 83 <=129 mg/dL    Patient Fasting > 8hrs? Yes    AST (SGOT)   Result Value Ref Range    AST 22 0 - 40 U/L   ALT (SGPT)   Result Value Ref Range    ALT 26 0 - 45 U/L       Assessment:  1. Hyperlipidemia  Lipid [place] FASTING    AST (SGOT)    ALT (SGPT)   2. Benign Essential Hypertension     3. Major depressive disorder, single episode       Hyperlipidemia controlled stay on atorvastatin    Hypertension controlled continue amlodipine/benazepril    Major depression controlled on sertraline    Plan: As outlined above encouraged to quit smoking    Plan for physical this fall    This transcription uses voice recognition software, which may contain typographical errors.

## 2021-06-21 NOTE — LETTER
Letter by Almas Mathews MD at      Author: Almas Mathews MD Service: -- Author Type: --    Filed:  Encounter Date: 1/5/2021 Status: (Other)         Alonzo TENISHA Myra  [address] [place] [address]      January 5, 2021      Dear Alonzo,    As a valued [facility] patient, your healthcare needs are our priority.  Your health care team has determined that you are due for an appointment regarding your medication follow up.    To help prevent delays in your care, please call the [facility] at [phone].    We look forward to partnering with you to achieve optimal health and wellbeing.    Sincerely,  Your care team at [facility]

## 2021-06-23 NOTE — TELEPHONE ENCOUNTER
Refill Approved    Rx renewed per Medication Renewal Policy. Medication was last renewed on 2/10/18.    Ov: 5/1/18    Rebecca Multani, Care Connection Triage/Med Refill 1/25/2019     Requested Prescriptions   Pending Prescriptions Disp Refills     amLODIPine-benazepril (LOTREL) 10-20 mg per capsule [Pharmacy Med Name: AMLODIPINE-BENAZ 10/20MG CAPSULES] 90 capsule 0     Sig: TAKE 1 CAPSULE BY MOUTH DAILY    Ace Inhibitors Refill Protocol Passed - 1/23/2019  9:34 AM       Passed - PCP or prescribing provider visit in past 12 months      Last office visit with prescriber/PCP: 5/1/2018 Almas Mathews MD OR same dept: 5/1/2018 Almas Mathews MD OR same specialty: 5/1/2018 Almas Mathews MD  Last physical: 2/6/2018 Last MTM visit: Visit date not found   Next visit within 3 mo: Visit date not found  Next physical within 3 mo: Visit date not found  Prescriber OR PCP: Almas Mathews MD  Last diagnosis associated with med order: 1. Benign Essential Hypertension  - amLODIPine-benazepril (LOTREL) 10-20 mg per capsule [Pharmacy Med Name: AMLODIPINE-BENAZ 10/20MG CAPSULES]; TAKE 1 CAPSULE BY MOUTH DAILY  Dispense: 90 capsule; Refill: 0    If protocol passes may refill for 12 months if within 3 months of last provider visit (or a total of 15 months).            Passed - Serum Potassium in past 12 months    Lab Results   Component Value Date    Potassium 4.1 02/06/2018            Passed - Blood pressure filed in past 12 months    BP Readings from Last 1 Encounters:   05/01/18 118/70            Passed - Serum Creatinine in past 12 months    Creatinine   Date Value Ref Range Status   02/06/2018 0.78 0.70 - 1.30 mg/dL Final

## 2021-10-19 DIAGNOSIS — I10 ESSENTIAL HYPERTENSION, BENIGN: ICD-10-CM

## 2021-10-19 NOTE — TELEPHONE ENCOUNTER
Pending Prescriptions:                       Disp   Refills    amLODIPine-benazepril (LOTREL) 10-20 MG c*90 cap*1            Sig: Take 1 capsule by mouth daily

## 2021-10-20 RX ORDER — AMLODIPINE AND BENAZEPRIL HYDROCHLORIDE 10; 20 MG/1; MG/1
1 CAPSULE ORAL DAILY
Qty: 90 CAPSULE | Refills: 1 | Status: SHIPPED | OUTPATIENT
Start: 2021-10-20 | End: 2022-04-27

## 2021-10-20 NOTE — TELEPHONE ENCOUNTER
"Last Written Prescription Date:  4/15/21  Last Fill Quantity: 90,  # refills: 1   Last office visit provider:  4/15/21     Requested Prescriptions   Pending Prescriptions Disp Refills     amLODIPine-benazepril (LOTREL) 10-20 MG capsule 90 capsule 1     Sig: Take 1 capsule by mouth daily       Calcium Channel Blockers Protocol  Passed - 10/19/2021  9:44 AM        Passed - Blood pressure under 140/90 in past 12 months     BP Readings from Last 3 Encounters:   04/19/21 130/84   01/29/20 126/74   12/18/19 124/70                 Passed - Recent (12 mo) or future (30 days) visit within the authorizing provider's specialty     Patient has had an office visit with the authorizing provider or a provider within the authorizing providers department within the previous 12 mos or has a future within next 30 days. See \"Patient Info\" tab in inbasket, or \"Choose Columns\" in Meds & Orders section of the refill encounter.              Passed - Medication is active on med list        Passed - Patient is age 18 or older        Passed - Normal serum creatinine on file in past 12 months     Recent Labs   Lab Test 04/19/21  1400   CR 0.93       Ok to refill medication if creatinine is low         ACE Inhibitors (Including Combos) Protocol Passed - 10/19/2021  9:44 AM        Passed - Blood pressure under 140/90 in past 12 months     BP Readings from Last 3 Encounters:   04/19/21 130/84   01/29/20 126/74   12/18/19 124/70                 Passed - Recent (12 mo) or future (30 days) visit within the authorizing provider's specialty     Patient has had an office visit with the authorizing provider or a provider within the authorizing providers department within the previous 12 mos or has a future within next 30 days. See \"Patient Info\" tab in inbasket, or \"Choose Columns\" in Meds & Orders section of the refill encounter.              Passed - Medication is active on med list        Passed - Patient is age 18 or older        Passed - Normal " serum creatinine on file in past 12 months     Recent Labs   Lab Test 04/19/21  1400   CR 0.93       Ok to refill medication if creatinine is low          Passed - Normal serum potassium on file in past 12 months     Recent Labs   Lab Test 04/19/21  1400   POTASSIUM 3.9                  Marco Antonio Ervin RN 10/20/21 10:20 AM

## 2021-12-09 ENCOUNTER — PATIENT OUTREACH (OUTPATIENT)
Dept: GERIATRIC MEDICINE | Facility: CLINIC | Age: 66
End: 2021-12-09
Payer: COMMERCIAL

## 2021-12-09 NOTE — LETTER
December 9, 2021    ALONZO RODRÍGUEZ  [address] [place] [address]      Dear Alonzo,    Welcome to [facility] ([facility]) ([facility]) health program. My name is DIANNA Martinez. I am your [facility] care coordinator. You are eligible for Care Coordination through [facility].    Here is how Care Coordination works:    I will meet with you in person to determine your care coordination needs    We will develop a plan of care to meet your needs    We will create a service plan showing the services you will receive    We will talk about and coordinate any preventive care needs you have    I will call you soon to see how you are doing and determine what needs you may have. Our goal is to keep you as healthy and independent as possible.     [facility] combines the benefits you may already receive from Medical Assistance, Medicare and the Prescription Drug Coverage Program.    Soon you will receive a new [facility] member identification (ID) card from [facility]. When you receive it, please use this card where you get your health services.    Being in the [place] [facility] ([facility]) ([facility]) Care Coordination program is voluntary and offered to you at no cost. If you ever wish to stop being in the Care Coordination program or have questions, call me at [phone]. If you reach my voice mail, leave a message and your phone number. If you are hearing impaired, call the [place] [facility] at [phone] or [phone] (fpdsvg-lj-lwgpft relay service).  Sincerely,    DIANNA Martinez  [phone]  [email or web address] ([facility]) is a health plan that contracts with both Medicare and the [place] Medical Assistance (Medicaid) program to provide benefits of both programs to enrollees. Enrollment in [facility] depends on contract renewal.    [id number] accepted       (12/2019)

## 2021-12-09 NOTE — PROGRESS NOTES
[facility] Care Coordination Contact    Member became effective with [facility] on 12/1/2021 with [facility].  Previous Health Plan: [facility]  Previous Care System:   Previous care coordinators name and number: NA  Waiver Type: N/A  Last MMIS Entry: Date NA and Type NA  MMIS visit date (and type) if different from above: NA  Services Listed in MMIS:   UTF received: Not requested due to no previous MMIS.     Geeta Warren  [facility]  Case Management Specialist  [phone]

## 2021-12-16 ENCOUNTER — PATIENT OUTREACH (OUTPATIENT)
Dept: GERIATRIC MEDICINE | Facility: CLINIC | Age: 66
End: 2021-12-16
Payer: COMMERCIAL

## 2021-12-23 NOTE — PROGRESS NOTES
12/23/21    Called member to schedule annual HRA home visit. Received an automated message that the call cannot be completed at this time and to try again later. Reviewed [facility]'s member information document and no phone number listed on there.     DIANNA Martinez  [facility]  [phone]

## 2021-12-23 NOTE — PROGRESS NOTES
Called member to schedule annual HRA home visit. Called phone number listed for member but received an automated message stating that the call cannot be completed at this time and to try again later.     DIANNA Martinez  [facility]  [phone]

## 2021-12-24 ENCOUNTER — PATIENT OUTREACH (OUTPATIENT)
Dept: GERIATRIC MEDICINE | Facility: CLINIC | Age: 66
End: 2021-12-24
Payer: COMMERCIAL

## 2021-12-24 NOTE — LETTER
December 24, 2021      ALONZO RODRÍGUEZ  [address] [place] [address] [address]  [place] [address]        Dear Alonzo:     Your Care Coordinator has been unable to reach you by telephone. I am writing to ask you or an authorized representative to call me at [phone]. If you reach my voicemail, please leave a message with your daytime telephone number and a date and time that I can call you. If you are hearing impaired, please call the [place] [facility]ay at [phone] or [phone] (qlesrs-si-dbccso relay service).     The reason I am trying to reach you is:    [] Six (6) month check-in  [x] To schedule your annual assessment  [] Other:      Please call me as soon as you receive this letter. I look forward to speaking with you.    Sincerely,    DIANNA Martinez  [phone]  [email or web address] ([facility]) is a health plan that contracts with both Medicare and the [place] Medical Assistance (Medicaid) program to provide benefits of both programs to enrollees. Enrollment in [facility] depends on contract renewal.    [facility]+[id number] DHS APPROVED ([id number])  [id number] (02/19)

## 2021-12-28 ENCOUNTER — PATIENT OUTREACH (OUTPATIENT)
Dept: GERIATRIC MEDICINE | Facility: CLINIC | Age: 66
End: 2021-12-28
Payer: COMMERCIAL

## 2021-12-28 NOTE — LETTER
December 28, 2021      ALONZO RODRÍGUEZ  [address] [place] [address] [address]  [place] [address]        Dear Alonzo:     Your Care Coordinator has been unable to reach you by telephone. I am writing to ask you or an authorized representative to call me at [phone]. If you reach my voicemail, please leave a message with your daytime telephone number and a date and time that I can call you. If you are hearing impaired, please call the [place] [facility]ay at [phone] or [phone] (fapres-ya-sblfxr relay service).     The reason I am trying to reach you is:    [] Six (6) month check-in  [x] To schedule your annual assessment  [x] Other:  initial    Please call me as soon as you receive this letter. I look forward to speaking with you.    Sincerely,    DIANNA Martinez  [phone]  [email or web address] ([facility]) is a health plan that contracts with both Medicare and the [place] Medical Assistance (Medicaid) program to provide benefits of both programs to enrollees. Enrollment in [facility] depends on contract renewal.    [id number] DHS APPROVED ([id number])  [id number] (02/19)

## 2021-12-29 NOTE — PROGRESS NOTES
"[facility] Care Coordination Contact    Per CC, mailed client an \"Unable to Contact\" letter.    Geeta Warren  [facility]  Case Management Specialist  [phone]    "

## 2021-12-29 NOTE — PROGRESS NOTES
"[facility] Care Coordination Contact    Per CC, mailed client an \"Unable to Contact\" letter.    Geeta Warren  [facility]  Case Management Specialist  [phone]    "

## 2021-12-30 ENCOUNTER — PATIENT OUTREACH (OUTPATIENT)
Dept: GERIATRIC MEDICINE | Facility: CLINIC | Age: 66
End: 2021-12-30
Payer: COMMERCIAL

## 2021-12-30 NOTE — LETTER
December 30, 2021      ALONZO RODRÍGUEZ  [address]  [place] [address]        Dear Alonzo:     Your Care Coordinator has been unable to reach you by telephone. I am writing to ask you or an authorized representative to call me at [phone]. If you reach my voicemail, please leave a message with your daytime telephone number and a date and time that I can call you. If you are hearing impaired, please call the [place] Relay at [phone] or [phone] (bojhuw-gk-lcqlui relay service).     The reason I am trying to reach you is:    [] Six (6) month check-in  [x] To schedule your annual assessment  [] Other:      Please call me as soon as you receive this letter. I look forward to speaking with you.    Sincerely,    DIANNA Martinez  [phone]  [email or web address].[facility] ([facility]) is a health plan that contracts with both Medicare and the [place] Medical Assistance (Medicaid) program to provide benefits of both programs to enrollees. Enrollment in [facility] depends on contract renewal.    [id number] DHS APPROVED ([id number])  [id number] (02/19)

## 2021-12-31 ENCOUNTER — PATIENT OUTREACH (OUTPATIENT)
Dept: GERIATRIC MEDICINE | Facility: CLINIC | Age: 66
End: 2021-12-31
Payer: COMMERCIAL

## 2022-01-05 NOTE — PROGRESS NOTES
Completed 4 attempts to reach client with no response.  Member is officially unable to contact effective today.  Completed MMIS entry.  Completed health plan required [facility] POC.    Follow-up Plan: CC will attempt to reach member in six months.    DIANNA Martinez  [facility]  [phone]

## 2022-01-06 NOTE — PROGRESS NOTES
"[facility] Care Coordination Contact    Per CC, mailed client an \"Unable to Contact\" letter.    Geeta Warren  [facility]  Case Management Specialist  [phone]    "

## 2022-01-10 NOTE — PROGRESS NOTES
"[facility] Care Coordination Contact    Per CC, mailed client an \"Unable to Contact\" letter.    Geeta Warren  [facility]  Case Management Specialist  [phone]    "

## 2022-06-27 ENCOUNTER — OFFICE VISIT (OUTPATIENT)
Dept: FAMILY MEDICINE | Facility: CLINIC | Age: 67
End: 2022-06-27
Payer: COMMERCIAL

## 2022-06-27 VITALS
DIASTOLIC BLOOD PRESSURE: 72 MMHG | OXYGEN SATURATION: 97 % | WEIGHT: 154 LBS | RESPIRATION RATE: 20 BRPM | SYSTOLIC BLOOD PRESSURE: 136 MMHG | BODY MASS INDEX: 23.24 KG/M2 | HEART RATE: 90 BPM

## 2022-06-27 DIAGNOSIS — I10 ESSENTIAL HYPERTENSION, BENIGN: Primary | ICD-10-CM

## 2022-06-27 DIAGNOSIS — Z23 NEED FOR VACCINATION: ICD-10-CM

## 2022-06-27 DIAGNOSIS — D12.6 BENIGN NEOPLASM OF COLON, UNSPECIFIED PART OF COLON: ICD-10-CM

## 2022-06-27 DIAGNOSIS — E55.9 VITAMIN D DEFICIENCY: ICD-10-CM

## 2022-06-27 DIAGNOSIS — R35.1 NOCTURIA: ICD-10-CM

## 2022-06-27 DIAGNOSIS — F32.5 MAJOR DEPRESSIVE DISORDER WITH SINGLE EPISODE, IN FULL REMISSION (H): ICD-10-CM

## 2022-06-27 DIAGNOSIS — E78.5 HYPERLIPIDEMIA, UNSPECIFIED HYPERLIPIDEMIA TYPE: ICD-10-CM

## 2022-06-27 DIAGNOSIS — Z72.0 TOBACCO USE: ICD-10-CM

## 2022-06-27 LAB
ALBUMIN SERPL-MCNC: 4 G/DL (ref 3.5–5)
ALBUMIN UR-MCNC: NEGATIVE MG/DL
ALP SERPL-CCNC: 47 U/L (ref 45–120)
ALT SERPL W P-5'-P-CCNC: 24 U/L (ref 0–45)
ANION GAP SERPL CALCULATED.3IONS-SCNC: 11 MMOL/L (ref 5–18)
APPEARANCE UR: CLEAR
AST SERPL W P-5'-P-CCNC: 27 U/L (ref 0–40)
BACTERIA #/AREA URNS HPF: ABNORMAL /HPF
BILIRUB SERPL-MCNC: 0.5 MG/DL (ref 0–1)
BILIRUB UR QL STRIP: NEGATIVE
BUN SERPL-MCNC: 12 MG/DL (ref 8–22)
CALCIUM SERPL-MCNC: 9.6 MG/DL (ref 8.5–10.5)
CHLORIDE BLD-SCNC: 106 MMOL/L (ref 98–107)
CHOLEST SERPL-MCNC: 156 MG/DL
CO2 SERPL-SCNC: 24 MMOL/L (ref 22–31)
COLOR UR AUTO: YELLOW
CREAT SERPL-MCNC: 0.79 MG/DL (ref 0.7–1.3)
ERYTHROCYTE [DISTWIDTH] IN BLOOD BY AUTOMATED COUNT: 12.6 % (ref 10–15)
FASTING STATUS PATIENT QL REPORTED: NO
GFR SERPL CREATININE-BSD FRML MDRD: >90 ML/MIN/1.73M2
GLUCOSE BLD-MCNC: 104 MG/DL (ref 70–125)
GLUCOSE UR STRIP-MCNC: NEGATIVE MG/DL
HCT VFR BLD AUTO: 48.4 % (ref 40–53)
HDLC SERPL-MCNC: 62 MG/DL
HGB BLD-MCNC: 16.3 G/DL (ref 13.3–17.7)
HGB UR QL STRIP: NEGATIVE
KETONES UR STRIP-MCNC: NEGATIVE MG/DL
LDLC SERPL CALC-MCNC: 74 MG/DL
LEUKOCYTE ESTERASE UR QL STRIP: NEGATIVE
MCH RBC QN AUTO: 31.9 PG (ref 26.5–33)
MCHC RBC AUTO-ENTMCNC: 33.7 G/DL (ref 31.5–36.5)
MCV RBC AUTO: 95 FL (ref 78–100)
NITRATE UR QL: NEGATIVE
PH UR STRIP: 5.5 [PH] (ref 5–8)
PLATELET # BLD AUTO: 174 10E3/UL (ref 150–450)
POTASSIUM BLD-SCNC: 4.6 MMOL/L (ref 3.5–5)
PROT SERPL-MCNC: 7.4 G/DL (ref 6–8)
PSA SERPL-MCNC: 7.75 UG/L (ref 0–4.5)
RBC # BLD AUTO: 5.11 10E6/UL (ref 4.4–5.9)
RBC #/AREA URNS AUTO: ABNORMAL /HPF
SODIUM SERPL-SCNC: 141 MMOL/L (ref 136–145)
SP GR UR STRIP: <=1.005 (ref 1–1.03)
SQUAMOUS #/AREA URNS AUTO: ABNORMAL /LPF
TRIGL SERPL-MCNC: 101 MG/DL
UROBILINOGEN UR STRIP-ACNC: 0.2 E.U./DL
WBC # BLD AUTO: 5.8 10E3/UL (ref 4–11)
WBC #/AREA URNS AUTO: ABNORMAL /HPF

## 2022-06-27 PROCEDURE — 36415 COLL VENOUS BLD VENIPUNCTURE: CPT | Performed by: FAMILY MEDICINE

## 2022-06-27 PROCEDURE — 0054A COVID-19,PF,PFIZER (12+ YRS): CPT | Performed by: FAMILY MEDICINE

## 2022-06-27 PROCEDURE — 91305 COVID-19,PF,PFIZER (12+ YRS): CPT | Performed by: FAMILY MEDICINE

## 2022-06-27 PROCEDURE — 81001 URINALYSIS AUTO W/SCOPE: CPT | Performed by: FAMILY MEDICINE

## 2022-06-27 PROCEDURE — 80061 LIPID PANEL: CPT | Performed by: FAMILY MEDICINE

## 2022-06-27 PROCEDURE — 90670 PCV13 VACCINE IM: CPT | Performed by: FAMILY MEDICINE

## 2022-06-27 PROCEDURE — 99214 OFFICE O/P EST MOD 30 MIN: CPT | Mod: 25 | Performed by: FAMILY MEDICINE

## 2022-06-27 PROCEDURE — 85027 COMPLETE CBC AUTOMATED: CPT | Performed by: FAMILY MEDICINE

## 2022-06-27 PROCEDURE — 80053 COMPREHEN METABOLIC PANEL: CPT | Performed by: FAMILY MEDICINE

## 2022-06-27 PROCEDURE — G0009 ADMIN PNEUMOCOCCAL VACCINE: HCPCS | Performed by: FAMILY MEDICINE

## 2022-06-27 PROCEDURE — 84153 ASSAY OF PSA TOTAL: CPT | Performed by: FAMILY MEDICINE

## 2022-06-27 PROCEDURE — 82306 VITAMIN D 25 HYDROXY: CPT | Performed by: FAMILY MEDICINE

## 2022-06-27 RX ORDER — ASPIRIN 81 MG/1
81 TABLET, CHEWABLE ORAL DAILY
Status: CANCELLED | OUTPATIENT
Start: 2022-06-27

## 2022-06-27 RX ORDER — SERTRALINE HYDROCHLORIDE 100 MG/1
100 TABLET, FILM COATED ORAL DAILY
Qty: 90 TABLET | Refills: 3 | Status: SHIPPED | OUTPATIENT
Start: 2022-06-27 | End: 2023-07-27

## 2022-06-27 RX ORDER — AMLODIPINE AND BENAZEPRIL HYDROCHLORIDE 10; 20 MG/1; MG/1
1 CAPSULE ORAL DAILY
Qty: 90 CAPSULE | Refills: 3 | Status: SHIPPED | OUTPATIENT
Start: 2022-06-27 | End: 2023-07-24

## 2022-06-27 RX ORDER — ATORVASTATIN CALCIUM 20 MG/1
20 TABLET, FILM COATED ORAL DAILY
Qty: 90 TABLET | Refills: 3 | Status: SHIPPED | OUTPATIENT
Start: 2022-06-27 | End: 2023-07-27

## 2022-06-27 ASSESSMENT — PATIENT HEALTH QUESTIONNAIRE - PHQ9
SUM OF ALL RESPONSES TO PHQ QUESTIONS 1-9: 0
SUM OF ALL RESPONSES TO PHQ QUESTIONS 1-9: 0

## 2022-06-27 NOTE — PROGRESS NOTES
ICD-10-CM    1. Essential hypertension, benign  I10 amLODIPine-benazepril (LOTREL) 10-20 MG capsule     UA with Microscopic reflex to Culture - Clinic Collect     Comprehensive metabolic panel (BMP + Alb, Alk Phos, ALT, AST, Total. Bili, TP)     Urine Microscopic   2. Hyperlipidemia, unspecified hyperlipidemia type  E78.5 atorvastatin (LIPITOR) 20 MG tablet     Lipid Profile (Chol, Trig, HDL, LDL calc)     Comprehensive metabolic panel (BMP + Alb, Alk Phos, ALT, AST, Total. Bili, TP)   3. Major depressive disorder with single episode, in full remission (H)  F32.5 sertraline (ZOLOFT) 100 MG tablet   4. Benign neoplasm of colon, unspecified part of colon  D12.6 CBC with platelets     Adult GI  Referral - Procedure Only   5. Vitamin D deficiency  E55.9 Vitamin D Deficiency   6. Nocturia  R35.1 PSA, tumor marker   7. Tobacco use  Z72.0    8. Need for vaccination  Z23 PCV13, IM (6+ WK) - [id number]       Hypertension controlled on amlodipine/benazepril, refill    Hyperlipidemia on atorvastatin Meds refilled we will check lipid and liver function    Major depression controlled on sertraline PHQ-9 score was 0    Adenomatous colon polyps we will recheck colonoscopy referral was placed    Vitamin D deficiency not on replacement we will recheck vitamin D level and give patient recommendation    Nocturia, check PSA    Tobacco use encouraged to quit.  He does not want screening for lung cancer or abdominal aortic aneurysm.    Immunization update with PCV13 as well as the COVID booster, Pfizer.        Patient be contacted with lab results and discuss follow-up    He knows I am retiring in early July          Boubacar Rosado is a 66 year old, presenting for the following health issues:  RECHECK      History of Present Illness       Hypertension: He presents for follow up of hypertension.  He does not check blood pressure  regularly outside of the clinic. Outpatient blood pressures have not been over 140/90. He  follows a low salt diet.      Today's PHQ-9         PHQ-9 Total Score: 0    PHQ-9 Q9 Thoughts of better off dead/self-harm past 2 weeks :   Not at all         Patient follows up on medications and medical conditions.    He has hypertension hyperlipidemia major depression.    He has a history of diverticulosis as well as colon polyps adenomatous.    Vitamin D deficiency, he is not taking vitamin D at this time    He continues to take sertraline.  His PHQ-9 was 0 today    He is on amlodipine benazepril combination for his blood pressure looks good    He is on atorvastatin for lipid management.    He smokes 1-1/2 packs/day.  He does not want any lung cancer screening.  He does not want an ultrasound to evaluate for abdominal aortic aneurysm.    Patient had a colonoscopy June 2017 he is due June 2022 (now).    I did put in a referral he will contact [facility] to get that scheduled    Immunizations today included the COVID booster, Pfizer as well as PCV13    Labs today vitamin D urine lipid PSA CBC and CMP    He does have nocturia times 1 at night.    No additional concern or issue    Review of Systems   10 point review of systems positive as outlined above otherwise negative      Objective    /72 (BP Location: Left arm, Patient Position: Sitting, Cuff Size: Adult Regular)   Pulse 90   Resp 20   Wt 69.9 kg (154 lb)   SpO2 97%   BMI 23.24 kg/m    Body mass index is 23.24 kg/m .  Physical Exam     General appearance no acute distress    Vital signs are stable.    O2 sat look good    HEENT unremarkable no adenopathy, oropharynx clear    Lungs clear no rales or rhonchi    Heart regular S1-S2 rate at 90.    Extremities without edema.    Abdomen nontender.  No masses.  No axillary inguinal adenopathy.    Lab work pending as outlined above    .  ..

## 2022-06-28 DIAGNOSIS — R97.20 ELEVATED PROSTATE SPECIFIC ANTIGEN (PSA): Primary | ICD-10-CM

## 2022-06-28 LAB — DEPRECATED CALCIDIOL+CALCIFEROL SERPL-MC: 17 UG/L (ref 20–75)

## 2022-07-01 DIAGNOSIS — F32.5 MAJOR DEPRESSIVE DISORDER WITH SINGLE EPISODE, IN FULL REMISSION (H): ICD-10-CM

## 2022-07-01 RX ORDER — SERTRALINE HYDROCHLORIDE 100 MG/1
TABLET, FILM COATED ORAL
Qty: 90 TABLET | Refills: 3 | OUTPATIENT
Start: 2022-07-01

## 2022-07-14 ENCOUNTER — TRANSFERRED RECORDS (OUTPATIENT)
Dept: HEALTH INFORMATION MANAGEMENT | Facility: CLINIC | Age: 67
End: 2022-07-14

## 2022-07-21 ENCOUNTER — PATIENT OUTREACH (OUTPATIENT)
Dept: GERIATRIC MEDICINE | Facility: CLINIC | Age: 67
End: 2022-07-21

## 2022-08-05 ASSESSMENT — ACTIVITIES OF DAILY LIVING (ADL): DEPENDENT_IADLS:: INDEPENDENT

## 2022-08-05 NOTE — PROGRESS NOTES
[facility] Home Visit Assessment     Home visit for Health Risk Assessment with Alonzo Renteria completed on July 21, 2022    Assessment completed over the phone due to COVID-19.    Type of residence:: Apartment  Current living arrangement:: I live alone     Assessment completed with:: Patient    Current Care Plan  Member currently receiving the following home care services:     Member currently receiving the following community resources: None      Medication Review  Medication reconciliation completed in Epic: If no, please explain Assessment completed over the phone due to COVID-19.   Medication set-up & administration: Independent-does not set up.  Self-administers medications.  Medication Risk Assessment Medication (1 or more, place referral to MTM): N/A: No risk factors identified  MTM Referral Placed: No: No risk factors idenified    Mental/Behavioral Health   Depression Screening:   PHQ-2 Total Score (Adult) - Positive if 3 or more points; Administer PHQ-9 if positive: 0       Mental health DX:: Yes   Mental health DX how managed:: Medication    Falls Assessment:   Fallen 2 or more times in the past year?: No   Any fall with injury in the past year?: No    ADL/IADL Dependencies:   Dependent ADLs:: Independent  Dependent IADLs:: Independent    [facility] sponsored benefits: Shared information re: Silver Sneakers/gym memberships, ASA, Calcium +D.    PCA Assessment completed at visit: No     Elderly Waiver Eligibility: No-does not meet criteria    Care Plan & Recommendations: Member will continue to meet with medical providers as needed. He will call CC Bee with any changes in health or needs.     See CC for detailed assessment information.    Follow-Up Plan: Member informed of future contact, plan to f/u with member with a 6 month telephone assessment.  Contact information shared with member and family, encouraged member to call with any questions or concerns at any time.    [facility]  continuum providers: Please see Snapshot and Care Management Flowsheets for Specific details of care plan.    This CC note routed to PCP.    DIANNA Martinez  [facility]  [phone]

## 2022-08-10 ENCOUNTER — PATIENT OUTREACH (OUTPATIENT)
Dept: GERIATRIC MEDICINE | Facility: CLINIC | Age: 67
End: 2022-08-10

## 2022-08-10 ENCOUNTER — TRANSFERRED RECORDS (OUTPATIENT)
Dept: HEALTH INFORMATION MANAGEMENT | Facility: CLINIC | Age: 67
End: 2022-08-10

## 2022-08-10 NOTE — LETTER
August 10, 2022      ALONZO RODRÍGUEZ  [address] [place] [address]      Dear Alonzo:    At [facility], we are dedicated to improving your health and well-being. Enclosed is the Comprehensive Care Plan that we developed with you on 7/21/22. Please review the Care Plan carefully.    As a reminder, some of the things we discussed at your visit include:    Your physical and mental health    Ways to reduce falls    Health care needs you may have    Don t forget to contact your care coordinator if you:    Have been hospitalized or plan to be hospitalized     Have had a fall     Have experienced a change in physical health    Are experiencing emotional problems     If you do not agree with your Care Plan, have questions about it, or have experienced a change in your needs, please call me at [phone]. If you are hearing impaired, please call the [place] Relay at 887 or [phone] (iawimh-og-qaeqbx relay service).    Sincerely,    DIANNA Martinez  [phone]  [email or web address] ([facility]) is a health plan that contracts with both Medicare and the [place] Medical Assistance (Medicaid) program to provide benefits of both programs to enrollees. Enrollment in [facility] depends on contract renewal.    [id number])     [id number] (11/18)

## 2022-08-10 NOTE — PROGRESS NOTES
[facility] Care Coordination Contact    Received after visit chart from care coordinator.  Completed following tasks: Mailed copy of care plan to client, Mailed copy of POC signature sheet for member to sign and return in SASE , Mailed Consent to Communicate form  and Mailed [employer] Safe Medication Disposal      Geeta Warren  [facility]  Case Management Specialist  [phone]

## 2022-08-19 ENCOUNTER — PATIENT OUTREACH (OUTPATIENT)
Dept: GERIATRIC MEDICINE | Facility: CLINIC | Age: 67
End: 2022-08-19

## 2022-08-19 NOTE — PROGRESS NOTES
CC updated program tasks and targets for [employer] launch.    DIANNA Martinez  [facility]  [phone]

## 2022-09-04 ENCOUNTER — HOSPITAL ENCOUNTER (OUTPATIENT)
Dept: MRI IMAGING | Facility: HOSPITAL | Age: 67
Discharge: HOME | End: 2022-09-04
Attending: STUDENT IN AN ORGANIZED HEALTH CARE EDUCATION/TRAINING PROGRAM | Admitting: STUDENT IN AN ORGANIZED HEALTH CARE EDUCATION/TRAINING PROGRAM
Payer: COMMERCIAL

## 2022-09-04 DIAGNOSIS — R97.20 ELEVATED PSA: ICD-10-CM

## 2022-09-04 PROCEDURE — 72197 MRI PELVIS W/O & W/DYE: CPT

## 2022-09-04 PROCEDURE — A9585 GADOBUTROL INJECTION: HCPCS | Performed by: STUDENT IN AN ORGANIZED HEALTH CARE EDUCATION/TRAINING PROGRAM

## 2022-09-04 PROCEDURE — 255N000002 HC RX 255 OP 636: Performed by: STUDENT IN AN ORGANIZED HEALTH CARE EDUCATION/TRAINING PROGRAM

## 2022-09-04 RX ORDER — GADOBUTROL 604.72 MG/ML
7 INJECTION INTRAVENOUS ONCE
Status: COMPLETED | OUTPATIENT
Start: 2022-09-04 | End: 2022-09-04

## 2022-09-04 RX ADMIN — GADOBUTROL 7 ML: 604.72 INJECTION INTRAVENOUS at 07:37

## 2022-09-15 ENCOUNTER — TRANSFERRED RECORDS (OUTPATIENT)
Dept: HEALTH INFORMATION MANAGEMENT | Facility: CLINIC | Age: 67
End: 2022-09-15

## 2022-10-13 ENCOUNTER — TRANSFERRED RECORDS (OUTPATIENT)
Dept: HEALTH INFORMATION MANAGEMENT | Facility: CLINIC | Age: 67
End: 2022-10-13

## 2023-01-20 PROBLEM — C61 PROSTATE CANCER (H): Status: ACTIVE | Noted: 2023-01-20

## 2023-02-20 ENCOUNTER — PATIENT OUTREACH (OUTPATIENT)
Dept: GERIATRIC MEDICINE | Facility: CLINIC | Age: 68
End: 2023-02-20
Payer: COMMERCIAL

## 2023-02-20 NOTE — PROGRESS NOTES
[facility] Six-Month Telephone Assessment    6 month telephone assessment completed on 2/20/23.    ER visits: No  Hospitalizations: No  TCU stays: No  Significant health status changes: Member reported that he was diagnosed with prostate cancer and would be needing surgery.   Falls/Injuries: No  ADL/IADL changes: No  Changes in services: No    Caregiver Assessment follow up:  N/A    Goals: See POC in chart for goal progress documentation.      Member reported that he has prostate cancer and would be needing surgery. He is trying to coordinate with the  to get the appointment schedule.    Will see member in 6 months for an annual health risk assessment.   Encouraged member to call CC with any questions or concerns in the meantime.     DIANNA Martinez  [facility]  [phone]

## 2023-03-07 ENCOUNTER — HOSPITAL ENCOUNTER (OUTPATIENT)
Facility: HOSPITAL | Age: 68
End: 2023-03-07
Attending: UROLOGY | Admitting: UROLOGY
Payer: COMMERCIAL

## 2023-03-20 RX ORDER — CEFAZOLIN SODIUM/WATER 2 G/20 ML
2 SYRINGE (ML) INTRAVENOUS SEE ADMIN INSTRUCTIONS
Status: CANCELLED | OUTPATIENT
Start: 2023-03-20

## 2023-03-20 RX ORDER — CEFAZOLIN SODIUM/WATER 2 G/20 ML
2 SYRINGE (ML) INTRAVENOUS
Status: CANCELLED | OUTPATIENT
Start: 2023-03-20

## 2023-04-04 ENCOUNTER — TRANSFERRED RECORDS (OUTPATIENT)
Dept: HEALTH INFORMATION MANAGEMENT | Facility: CLINIC | Age: 68
End: 2023-04-04

## 2023-06-27 ENCOUNTER — PATIENT OUTREACH (OUTPATIENT)
Dept: GERIATRIC MEDICINE | Facility: CLINIC | Age: 68
End: 2023-06-27
Payer: COMMERCIAL

## 2023-06-27 NOTE — PROGRESS NOTES
Called member to schedule annual HRA home visit. Left a message requesting a return call to schedule HRA.     Mandy Del Valle [facility]  [phone]

## 2023-06-28 NOTE — PROGRESS NOTES
Called member to schedule annual HRA home visit. HRA has been scheduled for Wednesday, July 5th at 10 AM. Member reported that he will be out of town starting on Thursday and not return until Monday, July 3rd.     Mandy Del Valle [facility]  [phone]

## 2023-07-05 NOTE — PROGRESS NOTES
7/5/23    Received a voicemail from member reporting that he is still out of town and is unsure when he will return back home. He stated that he would not be able to complete the assessment in-person.     Left a voicemail for member to return call regarding rescheduling the HRA that was scheduled for today.     DIANNA Martinez  [facility]  [phone]

## 2023-07-07 NOTE — PROGRESS NOTES
7/7/2023    Left voicemail for member to return call regarding HRA appointment.     DIANNA Martinez  [facility]  [phone]

## 2023-07-11 ENCOUNTER — PATIENT OUTREACH (OUTPATIENT)
Dept: GERIATRIC MEDICINE | Facility: CLINIC | Age: 68
End: 2023-07-11
Payer: COMMERCIAL

## 2023-07-11 NOTE — LETTER
July 31, 2023      ALONZO RODRÍGUEZ  [address]  [place] [address]        Dear Alonzo:     I m your care coordinator. I ve been unable to reach you by phone. I am writing to ask you or an authorized representative to call me at [phone]. If you reach my voicemail, please leave a message with your daytime telephone number. . Include a date and time that I can call you. If you are hearing impaired, call the [place] Relay at 506 or [phone] (etveot-he-pliypv relay service).     The reason I am trying to reach you is:    [x] To schedule an assessment  [] For your six (6)-month check-in  [] Other:      Please call me as soon as you receive this letter. I look forward to speaking with you.    Sincerely,    DIANNA Martinez  [phone]  [email or web address] ([facility]) is a health plan that contracts with both Medicare and the [place] Medical Assistance (Medicaid) program to provide benefits of both programs to enrollees. Enrollment in [facility] depends on contract renewal.    [id number] accepted  [id number]                                                                         A (08/2022)

## 2023-07-11 NOTE — LETTER
July 11, 2023      ALONZO RODRÍGUEZ  [address]  [place] [address]        Dear Alonzo:     I m your care coordinator. I ve been unable to reach you by phone. I am writing to ask you or an authorized representative to call me at [phone]. If you reach my voicemail, please leave a message with your daytime telephone number. . Include a date and time that I can call you. If you are hearing impaired, call the [place] Relay at 991 or [phone] (btaqge-ne-yeoipu relay service).     The reason I am trying to reach you is:    [x] To schedule an assessment  [] For your six (6)-month check-in  [] Other:      Please call me as soon as you receive this letter. I look forward to speaking with you.    Sincerely,    DIANNA Martinez  [phone]  [email or web address] ([facility]) is a health plan that contracts with both Medicare and the [place] Medical Assistance (Medicaid) program to provide benefits of both programs to enrollees. Enrollment in [facility] depends on contract renewal.    [id number] accepted  [id number]                                                                         A (08/2022)

## 2023-07-24 DIAGNOSIS — I10 ESSENTIAL HYPERTENSION, BENIGN: ICD-10-CM

## 2023-07-24 NOTE — TELEPHONE ENCOUNTER
Medication Question or Refill        What medication are you calling about (include dose and sig)?: amLODIPine-benazepril (LOTREL) 10-20 MG capsule     Preferred Pharmacy:   [id number] - [place], [place] - [address] [address] [address] [address]  Phone: [phone] Fax: [phone]    [id number] - [place], [place] - [address]  [address] [place] [address]  Phone: [phone] Fax: [phone]      Controlled Substance Agreement on file:   CSA -- Patient Level:    CSA: None found at the patient level.       Who prescribed the medication?: , no longer here. Routing to [facility]    Do you need a refill? Yes    When did you use the medication last? N/A    Patient offered an appointment? Yes: 7/27/23    Do you have any questions or concerns?  No      Okay to leave a detailed message?: No at Home number on file [phone] ([place])

## 2023-07-25 RX ORDER — AMLODIPINE AND BENAZEPRIL HYDROCHLORIDE 10; 20 MG/1; MG/1
1 CAPSULE ORAL DAILY
Qty: 90 CAPSULE | Refills: 0 | Status: SHIPPED | OUTPATIENT
Start: 2023-07-25 | End: 2023-07-27

## 2023-07-27 ENCOUNTER — OFFICE VISIT (OUTPATIENT)
Dept: FAMILY MEDICINE | Facility: CLINIC | Age: 68
End: 2023-07-27
Payer: COMMERCIAL

## 2023-07-27 VITALS
HEIGHT: 69 IN | RESPIRATION RATE: 12 BRPM | HEART RATE: 92 BPM | OXYGEN SATURATION: 98 % | BODY MASS INDEX: 23.85 KG/M2 | SYSTOLIC BLOOD PRESSURE: 150 MMHG | DIASTOLIC BLOOD PRESSURE: 82 MMHG | WEIGHT: 161 LBS | TEMPERATURE: 98.4 F

## 2023-07-27 DIAGNOSIS — E78.5 HYPERLIPIDEMIA, UNSPECIFIED HYPERLIPIDEMIA TYPE: ICD-10-CM

## 2023-07-27 DIAGNOSIS — C61 PROSTATE CANCER (H): ICD-10-CM

## 2023-07-27 DIAGNOSIS — E55.9 VITAMIN D DEFICIENCY: ICD-10-CM

## 2023-07-27 DIAGNOSIS — F32.5 MAJOR DEPRESSIVE DISORDER WITH SINGLE EPISODE, IN FULL REMISSION (H): ICD-10-CM

## 2023-07-27 DIAGNOSIS — F17.210 CIGARETTE NICOTINE DEPENDENCE WITHOUT COMPLICATION: ICD-10-CM

## 2023-07-27 DIAGNOSIS — I10 ESSENTIAL HYPERTENSION, BENIGN: Primary | ICD-10-CM

## 2023-07-27 LAB
ALT SERPL W P-5'-P-CCNC: 16 U/L (ref 0–70)
ANION GAP SERPL CALCULATED.3IONS-SCNC: 12 MMOL/L (ref 7–15)
BUN SERPL-MCNC: 10.5 MG/DL (ref 8–23)
CALCIUM SERPL-MCNC: 10.7 MG/DL (ref 8.8–10.2)
CHLORIDE SERPL-SCNC: 107 MMOL/L (ref 98–107)
CREAT SERPL-MCNC: 0.8 MG/DL (ref 0.67–1.17)
DEPRECATED HCO3 PLAS-SCNC: 22 MMOL/L (ref 22–29)
ERYTHROCYTE [DISTWIDTH] IN BLOOD BY AUTOMATED COUNT: 12.5 % (ref 10–15)
GFR SERPL CREATININE-BSD FRML MDRD: >90 ML/MIN/1.73M2
GLUCOSE SERPL-MCNC: 109 MG/DL (ref 70–99)
HCT VFR BLD AUTO: 48.5 % (ref 40–53)
HGB BLD-MCNC: 16.5 G/DL (ref 13.3–17.7)
MCH RBC QN AUTO: 33.2 PG (ref 26.5–33)
MCHC RBC AUTO-ENTMCNC: 34 G/DL (ref 31.5–36.5)
MCV RBC AUTO: 98 FL (ref 78–100)
PLATELET # BLD AUTO: 149 10E3/UL (ref 150–450)
POTASSIUM SERPL-SCNC: 3.8 MMOL/L (ref 3.4–5.3)
RBC # BLD AUTO: 4.97 10E6/UL (ref 4.4–5.9)
SODIUM SERPL-SCNC: 141 MMOL/L (ref 136–145)
WBC # BLD AUTO: 4.7 10E3/UL (ref 4–11)

## 2023-07-27 PROCEDURE — 99214 OFFICE O/P EST MOD 30 MIN: CPT | Performed by: PHYSICIAN ASSISTANT

## 2023-07-27 PROCEDURE — 85027 COMPLETE CBC AUTOMATED: CPT | Performed by: PHYSICIAN ASSISTANT

## 2023-07-27 PROCEDURE — 84460 ALANINE AMINO (ALT) (SGPT): CPT | Performed by: PHYSICIAN ASSISTANT

## 2023-07-27 PROCEDURE — 36415 COLL VENOUS BLD VENIPUNCTURE: CPT | Performed by: PHYSICIAN ASSISTANT

## 2023-07-27 PROCEDURE — 82306 VITAMIN D 25 HYDROXY: CPT | Performed by: PHYSICIAN ASSISTANT

## 2023-07-27 PROCEDURE — 80048 BASIC METABOLIC PNL TOTAL CA: CPT | Performed by: PHYSICIAN ASSISTANT

## 2023-07-27 RX ORDER — SERTRALINE HYDROCHLORIDE 100 MG/1
100 TABLET, FILM COATED ORAL DAILY
Qty: 90 TABLET | Refills: 3 | Status: SHIPPED | OUTPATIENT
Start: 2023-07-27

## 2023-07-27 RX ORDER — AMLODIPINE AND BENAZEPRIL HYDROCHLORIDE 10; 20 MG/1; MG/1
1 CAPSULE ORAL DAILY
Qty: 90 CAPSULE | Refills: 3 | Status: SHIPPED | OUTPATIENT
Start: 2023-07-27

## 2023-07-27 RX ORDER — ATORVASTATIN CALCIUM 20 MG/1
20 TABLET, FILM COATED ORAL DAILY
Qty: 90 TABLET | Refills: 3 | Status: SHIPPED | OUTPATIENT
Start: 2023-07-27

## 2023-07-27 ASSESSMENT — PATIENT HEALTH QUESTIONNAIRE - PHQ9
10. IF YOU CHECKED OFF ANY PROBLEMS, HOW DIFFICULT HAVE THESE PROBLEMS MADE IT FOR YOU TO DO YOUR WORK, TAKE CARE OF THINGS AT HOME, OR GET ALONG WITH OTHER PEOPLE: NOT DIFFICULT AT ALL
SUM OF ALL RESPONSES TO PHQ QUESTIONS 1-9: 0
SUM OF ALL RESPONSES TO PHQ QUESTIONS 1-9: 0

## 2023-07-27 NOTE — PROGRESS NOTES
"  Subjective:    Alonzo Renteria is a 67 year old male who presents with chief complaint of medication refills.  He was previously patient of Dr. Mathews, who retired.  He needed to come in to be seen in order to get his medications refilled.  He has not run out of his medicines yet.  He is also taking a daily 81 mg aspirin.    Generally feeling well.  No chest pain.  Occasional mild lower extremity edema that resolves overnight.  Smoking 1-1/2 packs a day for over 50 years.  Not interested in quitting at this time.    Patient Active Problem List   Diagnosis    Benign Adenomatous Polyp Of The Large Intestine    Coronary Arteriosclerosis    Major depressive disorder with single episode, in full remission (H)    Hyperlipidemia    Benign Essential Hypertension    Hemorrhoids    Diverticulosis    Elevated prostate specific antigen (PSA)    Prostate cancer (H)    Cigarette nicotine dependence without complication       Current Outpatient Medications:     amLODIPine-benazepril (LOTREL) 10-20 MG capsule, Take 1 capsule by mouth daily, Disp: 90 capsule, Rfl: 3    aspirin 81 mg chewable tablet, [ASPIRIN 81 MG CHEWABLE TABLET] Chew 81 mg daily., Disp: , Rfl:     atorvastatin (LIPITOR) 20 MG tablet, Take 1 tablet (20 mg) by mouth daily, Disp: 90 tablet, Rfl: 3    cholecalciferol, vitamin D3, 50 mcg (2,000 unit) Tab, [CHOLECALCIFEROL, VITAMIN D3, 50 MCG (2,000 UNIT) TAB] 1 p.o. daily, Disp: 30 tablet, Rfl: 5    sertraline (ZOLOFT) 100 MG tablet, Take 1 tablet (100 mg) by mouth daily, Disp: 90 tablet, Rfl: 3      Objective:   Allergies:  Clindamycin    Vitals:  Vitals:    07/27/23 1345 07/27/23 1353   BP: (!) 145/82 (!) 150/82   BP Location: Right arm Right arm   Patient Position: Sitting Sitting   Cuff Size: Adult Regular Adult Regular   Pulse: 98 92   Resp: 12    Temp: 98.4  F (36.9  C)    TempSrc: Temporal    SpO2: 98%    Weight: 73 kg (161 lb)    Height: 1.749 m (5' 8.86\")        Body mass index is 23.87 kg/m .    Vital signs " reviewed.  General: Patient is alert and oriented x 3, in no apparent distress  Cardiac: regular rate and rhythm, no murmurs  Pulmonary: lungs clear to auscultation bilaterally, no crackles, rales, rhonchi, or wheezing noted    Results for orders placed or performed in visit on 07/27/23   CBC with platelets     Status: Abnormal   Result Value Ref Range    WBC Count 4.7 4.0 - 11.0 10e3/uL    RBC Count 4.97 4.40 - 5.90 10e6/uL    Hemoglobin 16.5 13.3 - 17.7 g/dL    Hematocrit 48.5 40.0 - 53.0 %    MCV 98 78 - 100 fL    MCH 33.2 (H) 26.5 - 33.0 pg    MCHC 34.0 31.5 - 36.5 g/dL    RDW 12.5 10.0 - 15.0 %    Platelet Count 149 (L) 150 - 450 10e3/uL     Other labs pending.      Assessment and Plan:   1. Essential hypertension, benign  Chronic, generally well controlled.  Blood pressure mildly elevated today.  Screening labs ordered and I will follow-up with results.  Continue amlodipine-benazepril.  He has an appointment upcoming to establish care with a new PCP, recheck blood pressure at that time.  - amLODIPine-benazepril (LOTREL) 10-20 MG capsule; Take 1 capsule by mouth daily  Dispense: 90 capsule; Refill: 3  - Basic metabolic panel  - CBC with platelets    2. Hyperlipidemia, unspecified hyperlipidemia type  Chronic, stable.  Not fasting today.  ALT checked and I will follow-up with results.  Continue with atorvastatin, refilled.  - atorvastatin (LIPITOR) 20 MG tablet; Take 1 tablet (20 mg) by mouth daily  Dispense: 90 tablet; Refill: 3  - ALT; Future  - ALT    3. Vitamin D deficiency  Chronic, Stable.  Taking over-the-counter vitamin D, continue this.  Vitamin D level checked today and I will follow-up with results.  - Vitamin D deficiency screening; Future  - Vitamin D deficiency screening    4. Major depressive disorder with single episode, in full remission (H)  Chronic, stable.  Well-controlled with sertraline, continue this.  Refill sent.  - sertraline (ZOLOFT) 100 MG tablet; Take 1 tablet (100 mg) by mouth daily   Dispense: 90 tablet; Refill: 3    5. Prostate cancer (H)  Chronic, ongoing.  Following with urology.  Last visit with them was 10/13/2022.  Urologist had recommended robotic radical prostatectomy.  However patient declined surgery at time.    He is still in contact with urology.    6. Cigarette nicotine dependence without complication  Chronic, stable.  Smoking 1 and half packs a day, for over 50 years.  He is not interested in quitting at this time.  We discussed options for aortic aneurysm screening and lung cancer screening.  He declined those at this time.    7.  Healthcare maintenance.  We discussed shingles vaccine, he will consider this and then get it at his pharmacy if he wants.  We reviewed aortic aneurysm screening and lung cancer screening.  He will think about those.  He declines them today.  He has an upcoming appointment with Dr. Jacob to establish care, in September.      This dictation uses voice recognition software, which may contain typographical errors.

## 2023-07-28 PROBLEM — E55.9 VITAMIN D DEFICIENCY: Status: ACTIVE | Noted: 2023-07-28

## 2023-07-28 LAB — DEPRECATED CALCIDIOL+CALCIFEROL SERPL-MC: 57 UG/L (ref 20–75)

## 2023-07-28 NOTE — PROGRESS NOTES
Completed 4 attempts to reach client with no response.  Member is officially unable to contact effective today, 7/11/23.  Completed MMIS entry.  Completed health plan required [facility] POC.    Follow-up Plan: CC will attempt to reach member in six months.    This CC note not routed to PCP, Almas Mathews because he is retired. No PCP listed for member.     DIANNA Martinez  [facility]  [phone]

## 2023-07-31 NOTE — PROGRESS NOTES
"[facility] Care Coordination Contact    Per CC, mailed client an \"Unable to Contact\" letter.    Geeta Warren  [facility]  Case Management Specialist  [phone]      "

## 2023-09-06 ENCOUNTER — TELEPHONE (OUTPATIENT)
Dept: FAMILY MEDICINE | Facility: CLINIC | Age: 68
End: 2023-09-06
Payer: COMMERCIAL

## 2023-09-06 DIAGNOSIS — R73.9 HYPERGLYCEMIA: ICD-10-CM

## 2023-09-06 DIAGNOSIS — D69.6 THROMBOCYTOPENIA (H): Primary | ICD-10-CM

## 2023-09-06 DIAGNOSIS — I10 ESSENTIAL HYPERTENSION, BENIGN: ICD-10-CM

## 2023-09-06 NOTE — TELEPHONE ENCOUNTER
Patient returns call. Writer relay RN/provider's message below. Caller verbalizes understanding and has no further questions.

## 2023-09-06 NOTE — TELEPHONE ENCOUNTER
RN made  1 call to pt to relay provider message re: lab results.     No answer. LM on   with instruction to call back and ask for triage nurse and let staff know this is a return call.    If pt calls back, please relay message.    Will try again.    Angi CHRISTINE RN  [facility]     .----- Message from Estefanía Engle PA-C sent at 9/6/2023  8:48 AM CDT -----  Kidney and liver tests are normal.  Normal vitamin D level.  His blood sugar is a little bit high, that could be because he was not fasting.  A few other slight abnormalities were that his platelet count was tiny bit low and his calcium level was a tiny bit high.  I think they should return to normal on their own.  I would recommend coming in for a lab only visit in 1 to 2 months to recheck these things.  I put orders in for him.

## 2023-09-06 NOTE — TELEPHONE ENCOUNTER
----- Message from Estefanía Engle PA-C sent at 9/6/2023  8:48 AM CDT -----  Kidney and liver tests are normal.  Normal vitamin D level.  His blood sugar is a little bit high, that could be because he was not fasting.  A few other slight abnormalities were that his platelet count was tiny bit low and his calcium level was a tiny bit high.  I think they should return to normal on their own.  I would recommend coming in for a lab only visit in 1 to 2 months to recheck these things.  I put orders in for him.

## 2023-11-04 DIAGNOSIS — I10 ESSENTIAL HYPERTENSION, BENIGN: ICD-10-CM

## 2023-11-06 RX ORDER — AMLODIPINE AND BENAZEPRIL HYDROCHLORIDE 10; 20 MG/1; MG/1
1 CAPSULE ORAL DAILY
Qty: 90 CAPSULE | Refills: 3 | OUTPATIENT
Start: 2023-11-06

## 2024-01-31 ENCOUNTER — PATIENT OUTREACH (OUTPATIENT)
Dept: GERIATRIC MEDICINE | Facility: CLINIC | Age: 69
End: 2024-01-31
Payer: COMMERCIAL

## 2024-01-31 NOTE — LETTER
February 16, 2024      ALONZO RODRÍGUEZ  [address]  [place] [address]        Dear Alonzo:     I m your care coordinator. I ve been unable to reach you by phone. I am writing to ask you or an authorized representative to call me at [phone]. If you reach my voicemail, please leave a message with your daytime telephone number. . Include a date and time that I can call you. If you are hearing impaired, call the [place] Relay at [phone] or [phone] (jzbssz-km-diqlgq relay service).     The reason I am trying to reach you is:    [] To schedule an assessment  [x] For your six (6)-month check-in  [] Other:      Please call me as soon as you receive this letter. I look forward to speaking with you.    Sincerely,    DIANNA Martinez  [phone]  [email or web address] ([facility] [place]) is a health plan that contracts with both Medicare and the [place] Medical Assistance (Medicaid) program to provide benefits of both programs to enrollees. Enrollment in [facility] depends on contract renewal.    [id number] accepted  [id number]                                                                         A (08/2022)

## 2024-02-16 NOTE — PROGRESS NOTES
"[facility] Care Coordination Contact    Per CC, mailed client an \"Unable to Contact\" letter.    Geeta Warren  [facility]  Case Management Specialist  [phone]        "

## 2024-05-30 ENCOUNTER — PATIENT OUTREACH (OUTPATIENT)
Dept: GERIATRIC MEDICINE | Facility: CLINIC | Age: 69
End: 2024-05-30
Payer: COMMERCIAL

## 2024-05-30 NOTE — PROGRESS NOTES
[facility] Care Coordination Contact    No longer active with [facility] community case management effective 4/30/24.  Reason for community disenrollment: DIANNA Villarreal  [facility]  [phone]